# Patient Record
Sex: MALE | Race: OTHER | HISPANIC OR LATINO | Employment: FULL TIME | URBAN - METROPOLITAN AREA
[De-identification: names, ages, dates, MRNs, and addresses within clinical notes are randomized per-mention and may not be internally consistent; named-entity substitution may affect disease eponyms.]

---

## 2018-06-19 ENCOUNTER — HOSPITAL ENCOUNTER (EMERGENCY)
Facility: HOSPITAL | Age: 23
Discharge: HOME | End: 2018-06-19
Attending: EMERGENCY MEDICINE | Admitting: EMERGENCY MEDICINE
Payer: COMMERCIAL

## 2018-06-19 VITALS
DIASTOLIC BLOOD PRESSURE: 56 MMHG | RESPIRATION RATE: 16 BRPM | HEART RATE: 86 BPM | SYSTOLIC BLOOD PRESSURE: 114 MMHG | BODY MASS INDEX: 23.57 KG/M2 | OXYGEN SATURATION: 97 % | TEMPERATURE: 100.2 F | HEIGHT: 63 IN | WEIGHT: 133 LBS

## 2018-06-19 DIAGNOSIS — J02.0 STREP THROAT: Primary | ICD-10-CM

## 2018-06-19 DIAGNOSIS — E86.0 DEHYDRATION: ICD-10-CM

## 2018-06-19 LAB
ANION GAP SERPL CALCULATED.3IONS-SCNC: 9 MMOL/L (ref 4–13)
BASOPHILS # BLD AUTO: 0.01 THOUSANDS/ΜL (ref 0–0.1)
BASOPHILS NFR BLD AUTO: 0 % (ref 0–1)
BUN SERPL-MCNC: 14 MG/DL (ref 5–25)
CALCIUM SERPL-MCNC: 9.1 MG/DL (ref 8.3–10.1)
CHLORIDE SERPL-SCNC: 94 MMOL/L (ref 100–108)
CO2 SERPL-SCNC: 28 MMOL/L (ref 21–32)
CREAT SERPL-MCNC: 1.12 MG/DL (ref 0.6–1.3)
EOSINOPHIL # BLD AUTO: 0.01 THOUSAND/ΜL (ref 0–0.61)
EOSINOPHIL NFR BLD AUTO: 0 % (ref 0–6)
ERYTHROCYTE [DISTWIDTH] IN BLOOD BY AUTOMATED COUNT: 12.5 % (ref 11.6–15.1)
GFR SERPL CREATININE-BSD FRML MDRD: 93 ML/MIN/1.73SQ M
GLUCOSE SERPL-MCNC: 96 MG/DL (ref 65–140)
HCT VFR BLD AUTO: 42.7 % (ref 36.5–49.3)
HGB BLD-MCNC: 15.5 G/DL (ref 12–17)
LYMPHOCYTES # BLD AUTO: 0.85 THOUSANDS/ΜL (ref 0.6–4.47)
LYMPHOCYTES NFR BLD AUTO: 8 % (ref 14–44)
MCH RBC QN AUTO: 32.3 PG (ref 26.8–34.3)
MCHC RBC AUTO-ENTMCNC: 36.3 G/DL (ref 31.4–37.4)
MCV RBC AUTO: 89 FL (ref 82–98)
MONOCYTES # BLD AUTO: 1.56 THOUSAND/ΜL (ref 0.17–1.22)
MONOCYTES NFR BLD AUTO: 14 % (ref 4–12)
NEUTROPHILS # BLD AUTO: 8.93 THOUSANDS/ΜL (ref 1.85–7.62)
NEUTS SEG NFR BLD AUTO: 79 % (ref 43–75)
NRBC BLD AUTO-RTO: 0 /100 WBCS
PLATELET # BLD AUTO: 104 THOUSANDS/UL (ref 149–390)
PMV BLD AUTO: 9.7 FL (ref 8.9–12.7)
POTASSIUM SERPL-SCNC: 4.1 MMOL/L (ref 3.5–5.3)
RBC # BLD AUTO: 4.8 MILLION/UL (ref 3.88–5.62)
S PYO AG THROAT QL: POSITIVE
SODIUM SERPL-SCNC: 131 MMOL/L (ref 136–145)
WBC # BLD AUTO: 11.36 THOUSAND/UL (ref 4.31–10.16)

## 2018-06-19 PROCEDURE — 80048 BASIC METABOLIC PNL TOTAL CA: CPT | Performed by: EMERGENCY MEDICINE

## 2018-06-19 PROCEDURE — 36415 COLL VENOUS BLD VENIPUNCTURE: CPT | Performed by: EMERGENCY MEDICINE

## 2018-06-19 PROCEDURE — 85025 COMPLETE CBC W/AUTO DIFF WBC: CPT | Performed by: EMERGENCY MEDICINE

## 2018-06-19 PROCEDURE — 96374 THER/PROPH/DIAG INJ IV PUSH: CPT

## 2018-06-19 PROCEDURE — 87430 STREP A AG IA: CPT | Performed by: EMERGENCY MEDICINE

## 2018-06-19 PROCEDURE — 86308 HETEROPHILE ANTIBODY SCREEN: CPT | Performed by: EMERGENCY MEDICINE

## 2018-06-19 PROCEDURE — 96361 HYDRATE IV INFUSION ADD-ON: CPT

## 2018-06-19 PROCEDURE — 96375 TX/PRO/DX INJ NEW DRUG ADDON: CPT

## 2018-06-19 PROCEDURE — 99283 EMERGENCY DEPT VISIT LOW MDM: CPT

## 2018-06-19 RX ORDER — ONDANSETRON 2 MG/ML
4 INJECTION INTRAMUSCULAR; INTRAVENOUS ONCE
Status: COMPLETED | OUTPATIENT
Start: 2018-06-19 | End: 2018-06-19

## 2018-06-19 RX ORDER — AMOXICILLIN 500 MG/1
1000 CAPSULE ORAL EVERY 24 HOURS
Qty: 2 CAPSULE | Refills: 0 | Status: SHIPPED | OUTPATIENT
Start: 2018-06-19 | End: 2018-06-29

## 2018-06-19 RX ORDER — ACETAMINOPHEN 325 MG/1
650 TABLET ORAL ONCE
Status: COMPLETED | OUTPATIENT
Start: 2018-06-19 | End: 2018-06-19

## 2018-06-19 RX ORDER — AMOXICILLIN 250 MG/1
1000 CAPSULE ORAL ONCE
Status: COMPLETED | OUTPATIENT
Start: 2018-06-19 | End: 2018-06-19

## 2018-06-19 RX ORDER — KETOROLAC TROMETHAMINE 30 MG/ML
15 INJECTION, SOLUTION INTRAMUSCULAR; INTRAVENOUS ONCE
Status: COMPLETED | OUTPATIENT
Start: 2018-06-19 | End: 2018-06-19

## 2018-06-19 RX ORDER — NAPROXEN 250 MG/1
250 TABLET ORAL
Qty: 21 TABLET | Refills: 0 | Status: SHIPPED | OUTPATIENT
Start: 2018-06-19 | End: 2019-09-02

## 2018-06-19 RX ORDER — ONDANSETRON 4 MG/1
4 TABLET, FILM COATED ORAL EVERY 6 HOURS
Qty: 7 TABLET | Refills: 0 | Status: SHIPPED | OUTPATIENT
Start: 2018-06-19 | End: 2019-09-02

## 2018-06-19 RX ADMIN — KETOROLAC TROMETHAMINE 15 MG: 30 INJECTION, SOLUTION INTRAMUSCULAR at 16:03

## 2018-06-19 RX ADMIN — SODIUM CHLORIDE 1000 ML: 0.9 INJECTION, SOLUTION INTRAVENOUS at 16:02

## 2018-06-19 RX ADMIN — ACETAMINOPHEN 650 MG: 325 TABLET, FILM COATED ORAL at 16:03

## 2018-06-19 RX ADMIN — ONDANSETRON 4 MG: 2 INJECTION INTRAMUSCULAR; INTRAVENOUS at 16:01

## 2018-06-19 RX ADMIN — AMOXICILLIN 1000 MG: 250 CAPSULE ORAL at 17:14

## 2018-06-19 NOTE — DISCHARGE INSTRUCTIONS
Strep Throat, Ambulatory Care   GENERAL INFORMATION:   Strep throat  is a throat infection caused by bacteria  It is easily spread from person to person  Common symptoms include the following:   · Sore, red, and swollen throat    · Fever and headache     · Upset stomach, abdominal pain, or vomiting    · White or yellow patches or blisters in the back of your throat    · Tender, swollen lumps on the sides of your neck or jaw    · Throat pain when you swallow  Seek immediate care for the following symptoms:   · Throat pain that makes it too painful to drink fluids    · Drooling because you cannot swallow your spit    · Not able to open your mouth all the way or your voice is muffled    · Trouble breathing because your throat is swollen    · New symptoms like a bad headache, stiff neck, chest pain, or vomiting    · Blood in your urine and swollen face, feet, or hands  Treatment for strep throat:  You will need antibiotic medicine to treat your strep throat  Take your antibiotics until they are gone, even if you feel better  Do this unless your caregiver says it is okay to stop your antibiotics  You may return to work or school 24 hours after you start antibiotics  Manage strep throat:   · Do not smoke  If you smoke, it is never too late to quit  Smoking may make your symptoms worse  Ask for information if you need help quitting  · Drink juice, milk shakes, or soup  if your throat is too sore to eat solid food  Drinking liquids can also help prevent dehydration  · Gargle with salt water  Mix ¼ teaspoon salt in a glass of warm water and gargle  This may help reduce swelling in your throat  · Use lozenges, ice, soft foods, or popsicles  to soothe your throat    Prevent the spread of strep throat:   · Do not share food or drinks    · Wash your hands often    · Replace your toothbrush after you have taken antibiotics for 24 hours  Follow up with your healthcare provider as directed:  Write down your questions so you remember to ask them during your visits  CARE AGREEMENT:   You have the right to help plan your care  Learn about your health condition and how it may be treated  Discuss treatment options with your caregivers to decide what care you want to receive  You always have the right to refuse treatment  The above information is an  only  It is not intended as medical advice for individual conditions or treatments  Talk to your doctor, nurse or pharmacist before following any medical regimen to see if it is safe and effective for you  © 2014 [address] is for End User's use only and may not be sold, redistributed or otherwise used for commercial purposes  All illustrations and images included in CareNotes® are the copyrighted property of A D [employer] M [employer] Inc  or Felipe [employer]  Dehydration   WHAT YOU NEED TO KNOW:   Dehydration is a condition that develops when your body does not have enough fluid  You may become dehydrated if you do not drink enough water or lose too much fluid  Fluid loss may also cause loss of electrolytes (minerals), such as sodium  DISCHARGE INSTRUCTIONS:   Seek care immediately if:   · You have a seizure  · You are confused or cannot think clearly  · You are extremely sleepy, or another person cannot wake you  · You become dizzy or faint when you stand  · You are not able to urinate  · You have trouble breathing  · You have a fast or irregular heartbeat  · Your hands or feet are cold, or your face is pale  Contact your healthcare provider if:   · You have trouble drinking liquids because you are vomiting  · Your symptoms get worse  · You have a fever  · You feel very weak or tired  · You have questions or concerns about your condition or care  Follow up with your healthcare provider as directed:  Write down your questions so you remember to ask them during your visits     Prevent or manage dehydration: · Drink liquids as directed  Liquids that contain water, sugar, and minerals can help your body hold in fluid and help prevent dehydration  Drink liquids throughout the day, not just when you feel thirsty  Men should drink about 3 liters (13 eight-ounce cups) of liquid each day  Women should drink about 2 liters (9 eight-ounce cups) of liquid each day  Drink even more liquid if you will be outdoors, in the sun for a long time, or exercising  · Stay cool  Limit the time you spend outdoors during the hottest part of the day  Dress in lightweight clothes  · Keep track of how often you urinate  If you urinate less than usual or your urine is darker, drink more liquids  © 2017 [facility] Information is for End User's use only and may not be sold, redistributed or otherwise used for commercial purposes  All illustrations and images included in CareNotes® are the copyrighted property of A D [employer] M [employer] Inc  or [address]  The above information is an  only  It is not intended as medical advice for individual conditions or treatments  Talk to your doctor, nurse or pharmacist before following any medical regimen to see if it is safe and effective for you

## 2018-06-19 NOTE — ED PROVIDER NOTES
History  Chief Complaint   Patient presents with    Fever - 9 weeks to 74 years     pt reports fever, vomiting, body aches, neck pain x 3 days  Pt last took Ibuprofen at 1100 today  Vomiting bile   +generalized abd pain     [place] male presents for evaluation of fever since Sunday  Patient states the fever gets better when taking ibuprofen with improvement in symptoms  Patient rates the fevers are as high as 104-106  Patient states this is associated with a sore throat without difficulty swallowing or change in voice, mild diffuse headache, generalized body aches and malaise, mild neck pain, dry cough, soreness in his left upper quadrant which has been present for the past 2 days, constant, nonradiating, without modifying factors  Patient denies rash, recent travel or known sick contacts  History provided by:  Patient  Fever - 9 weeks to 74 years   Associated symptoms: cough, headaches, nausea, sore throat and vomiting    Associated symptoms: no chest pain, no congestion, no diarrhea, no dysuria, no ear pain, no myalgias, no rash and no rhinorrhea        None       History reviewed  No pertinent past medical history  History reviewed  No pertinent surgical history  History reviewed  No pertinent family history  I have reviewed and agree with the history as documented  Social History   Substance Use Topics    Smoking status: Heavy Tobacco Smoker     Packs/day: 0 50     Types: Cigarettes    Smokeless tobacco: Never Used    Alcohol use Yes      Comment: rarely         Review of Systems   Constitutional: Negative for activity change, appetite change, fatigue and fever  HENT: Positive for sore throat  Negative for congestion, dental problem, ear pain, rhinorrhea, trouble swallowing and voice change  Eyes: Negative for pain and redness  Respiratory: Positive for cough  Negative for chest tightness, shortness of breath and wheezing      Cardiovascular: Negative for chest pain and palpitations  Gastrointestinal: Positive for abdominal pain, nausea and vomiting  Negative for blood in stool, constipation and diarrhea  Endocrine: Negative for cold intolerance and heat intolerance  Genitourinary: Negative for dysuria, frequency, hematuria, scrotal swelling and testicular pain  Musculoskeletal: Positive for neck pain  Negative for arthralgias, myalgias and neck stiffness  Skin: Negative for color change, pallor and rash  Neurological: Positive for headaches  Negative for weakness and numbness  Hematological: Does not bruise/bleed easily  Psychiatric/Behavioral: Negative for agitation, hallucinations and suicidal ideas  Physical Exam  Physical Exam   Constitutional: He is oriented to person, place, and time  He appears well-developed and well-nourished  HENT:   Mouth/Throat: Oropharyngeal exudate present  TMs normal bilaterally +pharyngeal erythema no rhinorrhea nontender palpation of sinuses, normal looking turbinates  Midline uvula, nml voice, tolerating secretions   Eyes: Conjunctivae and EOM are normal    Neck: Normal range of motion  Neck supple  No meningeal signs   Cardiovascular: Normal rate, regular rhythm, normal heart sounds and intact distal pulses  Pulmonary/Chest: Effort normal and breath sounds normal  No respiratory distress  He has no wheezes  He has no rales  He exhibits no tenderness  Abdominal: Soft  Bowel sounds are normal  He exhibits no distension and no mass  There is no tenderness  No hernia  No cvat   Musculoskeletal: Normal range of motion  He exhibits no edema  Lymphadenopathy:     He has cervical adenopathy  Neurological: He is alert and oriented to person, place, and time  No cranial nerve deficit  Skin: No rash noted  No erythema  No edema   Psychiatric: He has a normal mood and affect  His behavior is normal    Nursing note and vitals reviewed        Vital Signs  ED Triage Vitals [06/19/18 1507]   Temperature Pulse Respirations Blood Pressure SpO2   (!) 101 4 °F (38 6 °C) (!) 116 18 146/77 98 %      Temp Source Heart Rate Source Patient Position - Orthostatic VS BP Location FiO2 (%)   Oral Monitor Sitting Right arm --      Pain Score       8           Vitals:    06/19/18 1507 06/19/18 1647   BP: 146/77 114/56   Pulse: (!) 116 86   Patient Position - Orthostatic VS: Sitting Lying       Visual Acuity      ED Medications  Medications   amoxicillin (AMOXIL) capsule 1,000 mg (not administered)   acetaminophen (TYLENOL) tablet 650 mg (650 mg Oral Given 6/19/18 1603)   sodium chloride 0 9 % bolus 1,000 mL (1,000 mL Intravenous New Bag 6/19/18 1602)   ketorolac (TORADOL) injection 15 mg (15 mg Intravenous Given 6/19/18 1603)   ondansetron (ZOFRAN) injection 4 mg (4 mg Intravenous Given 6/19/18 1601)       Diagnostic Studies  Results Reviewed     Procedure Component Value Units Date/Time    Basic metabolic panel [[id number]]  (Abnormal) Collected:  06/19/18 1600    Lab Status:  Final result Specimen:  Blood from Arm, Right Updated:  06/19/18 1633     Sodium 131 (L) mmol/L      Potassium 4 1 mmol/L      Chloride 94 (L) mmol/L      CO2 28 mmol/L      Anion Gap 9 mmol/L      BUN 14 mg/dL      Creatinine 1 12 mg/dL      Glucose 96 mg/dL      Calcium 9 1 mg/dL      eGFR 93 ml/min/1 73sq m     Narrative:         National Kidney Disease Education Program recommendations are as follows:  GFR calculation is accurate only with a steady state creatinine  Chronic Kidney disease less than 60 ml/min/1 73 sq  meters  Kidney failure less than 15 ml/min/1 73 sq  meters      Rapid Strep A Screen Only, Adults [[id number]]  (Abnormal) Collected:  06/19/18 1600    Lab Status:  Final result Specimen:  Throat from Throat Updated:  06/19/18 1617     Rapid Strep A Screen Positive (A)    CBC and differential [[id number]]  (Abnormal) Collected:  06/19/18 1600    Lab Status:  Final result Specimen:  Blood from Arm, Right Updated:  06/19/18 1611     WBC 11 36 (H) Thousand/uL      RBC 4 80 Million/uL      Hemoglobin 15 5 g/dL      Hematocrit 42 7 %      MCV 89 fL      MCH 32 3 pg      MCHC 36 3 g/dL      RDW 12 5 %      MPV 9 7 fL      Platelets 662 (L) Thousands/uL      nRBC 0 /100 WBCs      Neutrophils Relative 79 (H) %      Lymphocytes Relative 8 (L) %      Monocytes Relative 14 (H) %      Eosinophils Relative 0 %      Basophils Relative 0 %      Neutrophils Absolute 8 93 (H) Thousands/µL      Lymphocytes Absolute 0 85 Thousands/µL      Monocytes Absolute 1 56 (H) Thousand/µL      Eosinophils Absolute 0 01 Thousand/µL      Basophils Absolute 0 01 Thousands/µL     Mononucleosis screen [[id number]] Collected:  06/19/18 1600    Lab Status: In process Specimen:  Blood from Arm, Right Updated:  06/19/18 1606    POCT urinalysis dipstick [[id number]]     Lab Status:  No result Specimen:  Urine                  No orders to display              Procedures  Procedures       Phone Contacts  ED Phone Contact    ED Course  ED Course as of Jun 19 1710   Tue Jun 19, 2018   1622 RAPID STREP A SCREEN: (!) Positive                               MDM  CritCare Time    Disposition  Final diagnoses:   Strep throat   Dehydration     Time reflects when diagnosis was documented in both MDM as applicable and the Disposition within this note     Time User Action Codes Description Comment    6/19/2018  5:09 PM Agnes Contreras Add [J02 0] Strep throat     6/19/2018  5:09 PM Glo Godwin Add [E86 0] Dehydration       ED Disposition     ED Disposition Condition Comment    Discharge  Amisha Hand discharge to home/self care      Condition at discharge: Good        Follow-up Information     Follow up With Specialties Details Why Contact Info    Radha Nation MD  Schedule an appointment as soon as possible for a visit in 2 days  [address] [place] [id number] [phone]            Patient's Medications   Discharge Prescriptions    AMOXICILLIN (AMOXIL) 500 MG CAPSULE    Take 2 capsules (1,000 mg total) by mouth every 24 hours for 10 days       Start Date: 6/19/2018 End Date: 6/29/2018       Order Dose: 1,000 mg       Quantity: 2 capsule    Refills: 0    NAPROXEN (NAPROSYN) 250 MG TABLET    Take 1 tablet (250 mg total) by mouth 3 (three) times a day with meals for 7 days       Start Date: 6/19/2018 End Date: 6/26/2018       Order Dose: 250 mg       Quantity: 21 tablet    Refills: 0    ONDANSETRON (ZOFRAN) 4 MG TABLET    Take 1 tablet (4 mg total) by mouth every 6 (six) hours for 7 doses       Start Date: 6/19/2018 End Date: 6/21/2018       Order Dose: 4 mg       Quantity: 7 tablet    Refills: 0     No discharge procedures on file      ED Provider  Electronically Signed by           Jyoti Rowland MD  06/19/18 7316

## 2018-06-20 LAB — HETEROPH AB SER QL: NEGATIVE

## 2019-09-02 ENCOUNTER — HOSPITAL ENCOUNTER (EMERGENCY)
Facility: HOSPITAL | Age: 24
Discharge: HOME | End: 2019-09-02
Attending: EMERGENCY MEDICINE | Admitting: EMERGENCY MEDICINE
Payer: COMMERCIAL

## 2019-09-02 VITALS
BODY MASS INDEX: 23.86 KG/M2 | HEART RATE: 71 BPM | WEIGHT: 134.7 LBS | TEMPERATURE: 96.7 F | SYSTOLIC BLOOD PRESSURE: 155 MMHG | DIASTOLIC BLOOD PRESSURE: 84 MMHG | OXYGEN SATURATION: 99 % | RESPIRATION RATE: 18 BRPM

## 2019-09-02 DIAGNOSIS — K08.89 PAIN, DENTAL: Primary | ICD-10-CM

## 2019-09-02 PROCEDURE — 99282 EMERGENCY DEPT VISIT SF MDM: CPT

## 2019-09-02 PROCEDURE — 99284 EMERGENCY DEPT VISIT MOD MDM: CPT | Performed by: EMERGENCY MEDICINE

## 2019-09-02 RX ORDER — IBUPROFEN 600 MG/1
600 TABLET ORAL ONCE
Status: COMPLETED | OUTPATIENT
Start: 2019-09-02 | End: 2019-09-02

## 2019-09-02 RX ORDER — EMTRICITABINE AND TENOFOVIR DISOPROXIL FUMARATE 200; 300 MG/1; MG/1
1 TABLET, FILM COATED ORAL DAILY
COMMUNITY
Start: 2017-11-29

## 2019-09-02 RX ORDER — IBUPROFEN 600 MG/1
600 TABLET ORAL EVERY 6 HOURS PRN
Qty: 20 TABLET | Refills: 0 | Status: SHIPPED | OUTPATIENT
Start: 2019-09-02

## 2019-09-02 RX ORDER — PENICILLIN V POTASSIUM 500 MG/1
500 TABLET ORAL 4 TIMES DAILY
Qty: 28 TABLET | Refills: 0 | Status: SHIPPED | OUTPATIENT
Start: 2019-09-02 | End: 2019-09-09

## 2019-09-02 RX ORDER — PENICILLIN V POTASSIUM 250 MG/1
500 TABLET ORAL ONCE
Status: COMPLETED | OUTPATIENT
Start: 2019-09-02 | End: 2019-09-02

## 2019-09-02 RX ADMIN — PENICILLIN V POTASSIUM 500 MG: 250 TABLET, FILM COATED ORAL at 08:31

## 2019-09-02 RX ADMIN — IBUPROFEN 600 MG: 600 TABLET ORAL at 08:31

## 2019-09-02 NOTE — ED PROVIDER NOTES
History  Chief Complaint   Patient presents with    Dental Pain     Tooth pain for more than 1 month right side and top left  does not have a dentist  No pain meds  21-year-old male presenting with right lower and left upper dental pain this been ongoing for the last month  Patient has not been evaluated by dentist in "a few years"  No pain medication prior to arrival   Denies fevers  Able to tolerate p o  Without difficulty  Denies sore throat  Prior to Admission Medications   Prescriptions Last Dose Informant Patient Reported? Taking?   emtricitabine-tenofovir (TRUVADA) 200-300 mg per tablet More than a month at Unknown time  Yes No   Sig: Take 1 tablet by mouth daily      Facility-Administered Medications: None       History reviewed  No pertinent past medical history  History reviewed  No pertinent surgical history  History reviewed  No pertinent family history  I have reviewed and agree with the history as documented  Social History     Tobacco Use    Smoking status: Heavy Tobacco Smoker     Packs/day: 0 50     Types: Cigarettes    Smokeless tobacco: Never Used   Substance Use Topics    Alcohol use: Yes     Comment: rarely     Drug use: Yes     Types: Marijuana     Comment: last smoked 6/17/18        Review of Systems   Constitutional: Negative for chills, diaphoresis and fever  HENT: Positive for dental problem  Negative for congestion, rhinorrhea, sinus pressure, sore throat and trouble swallowing  Eyes: Negative for pain and visual disturbance  Respiratory: Negative for cough, shortness of breath and wheezing  Cardiovascular: Negative for chest pain and leg swelling  Gastrointestinal: Negative for abdominal pain, diarrhea, nausea and vomiting  Genitourinary: Negative for difficulty urinating, dysuria, frequency and urgency  Musculoskeletal: Negative for back pain and neck pain  Skin: Negative for color change and rash     Neurological: Negative for syncope, numbness and headaches  All other systems reviewed and are negative  Physical Exam  Physical Exam   Constitutional: He is oriented to person, place, and time  He appears well-developed and well-nourished  HENT:   Head: Normocephalic and atraumatic  Mouth/Throat: Oropharynx is clear and moist and mucous membranes are normal  No oral lesions  No trismus in the jaw  Dental caries present  No dental abscesses, uvula swelling or lacerations  No oropharyngeal exudate  Multiple dental caries  Tender to palpation of right lower molars and left upper molars  No evidence of abscess, edema, swelling  No gingival irritation  No submandibular or sublingual tenderness or edema  Posterior oropharynx clear, symmetrical    Eyes: Conjunctivae and EOM are normal    Neck: Normal range of motion  Neck supple  Musculoskeletal: Normal range of motion  He exhibits no edema  Neurological: He is alert and oriented to person, place, and time  Coordination normal    Skin: Skin is warm  Capillary refill takes less than 2 seconds  Nursing note and vitals reviewed        Vital Signs  ED Triage Vitals [09/02/19 0823]   Temperature Pulse Respirations Blood Pressure SpO2   (!) 96 7 °F (35 9 °C) 71 18 155/84 99 %      Temp Source Heart Rate Source Patient Position - Orthostatic VS BP Location FiO2 (%)   Tympanic Monitor Sitting Left arm --      Pain Score       Worst Possible Pain           Vitals:    09/02/19 0823   BP: 155/84   Pulse: 71   Patient Position - Orthostatic VS: Sitting         Visual Acuity      ED Medications  Medications   ibuprofen (MOTRIN) tablet 600 mg (600 mg Oral Given 9/2/19 0831)   penicillin V potassium (VEETID) tablet 500 mg (500 mg Oral Given 9/2/19 0831)       Diagnostic Studies  Results Reviewed     None                 No orders to display              Procedures  Procedures       ED Course                               MDM  Number of Diagnoses or Management Options  Pain, dental:   Diagnosis management comments: 26-year-old male presenting with dentalgia  Administer antibiotics and pain control  Follow up with dentist       Disposition  Final diagnoses:   Pain, dental     Time reflects when diagnosis was documented in both MDM as applicable and the Disposition within this note     Time User Action Codes Description Comment    9/2/2019  8:26 AM Laura Mercado Elian [K08 89] Pain, dental       ED Disposition     ED Disposition Condition Date/Time Comment    Discharge Stable Mon Sep 2, 2019  8:26 AM Irina Mojica discharge to home/self care  Follow-up Information     Follow up With Specialties Details Why [address]  In 2 days For further evaluation [facility] [address] [facility] Emergency Medicine  If symptoms worsen [address] [address] [phone]          Discharge Medication List as of 9/2/2019  8:27 AM      START taking these medications    Details   ibuprofen (MOTRIN) 600 mg tablet Take 1 tablet (600 mg total) by mouth every 6 (six) hours as needed for mild pain, Starting Mon 9/2/2019, Print      penicillin V potassium (VEETID) 500 mg tablet Take 1 tablet (500 mg total) by mouth 4 (four) times a day for 7 days, Starting Mon 9/2/2019, Until Mon 9/9/2019, Print         CONTINUE these medications which have NOT CHANGED    Details   naproxen (NAPROSYN) 250 mg tablet Take 1 tablet (250 mg total) by mouth 3 (three) times a day with meals for 7 days, Starting Tue 6/19/2018, Until Tue 6/26/2018, Print      ondansetron (ZOFRAN) 4 mg tablet Take 1 tablet (4 mg total) by mouth every 6 (six) hours for 7 doses, Starting Tue 6/19/2018, Until Thu 6/21/2018, Print           No discharge procedures on file      ED Provider  Electronically Signed by           Akila Barnard DO  09/02/19 6051

## 2020-02-20 ENCOUNTER — HOSPITAL ENCOUNTER (EMERGENCY)
Facility: HOSPITAL | Age: 25
Discharge: HOME | End: 2020-02-20
Attending: EMERGENCY MEDICINE

## 2020-02-20 VITALS
BODY MASS INDEX: 25.7 KG/M2 | OXYGEN SATURATION: 99 % | SYSTOLIC BLOOD PRESSURE: 138 MMHG | TEMPERATURE: 97.9 F | DIASTOLIC BLOOD PRESSURE: 79 MMHG | HEART RATE: 81 BPM | RESPIRATION RATE: 18 BRPM | WEIGHT: 145.06 LBS

## 2020-02-20 DIAGNOSIS — K08.89 DENTALGIA: ICD-10-CM

## 2020-02-20 DIAGNOSIS — K02.9 DENTAL CARIES: ICD-10-CM

## 2020-02-20 DIAGNOSIS — K04.7 DENTAL INFECTION: Primary | ICD-10-CM

## 2020-02-20 PROCEDURE — 99283 EMERGENCY DEPT VISIT LOW MDM: CPT

## 2020-02-20 PROCEDURE — 99284 EMERGENCY DEPT VISIT MOD MDM: CPT | Performed by: NURSE PRACTITIONER

## 2020-02-20 RX ORDER — AMOXICILLIN 250 MG/1
500 CAPSULE ORAL ONCE
Status: COMPLETED | OUTPATIENT
Start: 2020-02-20 | End: 2020-02-20

## 2020-02-20 RX ORDER — AMOXICILLIN 500 MG/1
500 CAPSULE ORAL 3 TIMES DAILY
Qty: 21 CAPSULE | Refills: 0 | Status: SHIPPED | OUTPATIENT
Start: 2020-02-20 | End: 2020-02-27

## 2020-02-20 RX ORDER — ACETAMINOPHEN 325 MG/1
650 TABLET ORAL ONCE
Status: COMPLETED | OUTPATIENT
Start: 2020-02-20 | End: 2020-02-20

## 2020-02-20 RX ADMIN — ACETAMINOPHEN 650 MG: 325 TABLET ORAL at 01:58

## 2020-02-20 RX ADMIN — AMOXICILLIN 500 MG: 250 CAPSULE ORAL at 01:58

## 2020-02-20 NOTE — DISCHARGE INSTRUCTIONS
You need to follow-up with dental clinic  You have been provided written instruction sheet on dental clinic choices and how to contact them  You appear to have dental infection  You have been prescribed Amoxicillin antibiotic as prescribed for dental infection  You may take Acetaminophen or Ibuprofen per OTC instructions as needed for pain  Practice good dental hygiene with teeth brushing and flossing  Follow-up with health department clinic for STI and HIV screening   Recommended using condoms to decrease kristen factors for STI and HIV

## 2020-02-20 NOTE — ED PROVIDER NOTES
History  Chief Complaint   Patient presents with    Dental Pain     pt states b/l toothache  upper/lower on left side  lower on right  27year old male presents to ED with cc of dental pain  He reports this has been going on since September of last year and has been gradually getting worse  He reports the dental pain location is the right lower side broken tooth, left upper side broken tooth, and left lower crowding of teeth due to need for wisdom teeth extraction  He reports the pain is worse with chewing certain foods and drinking cold fluid  He reports the pain sometimes feels  "soothed" when he uses listerine mouthwash  He tried motrin in the past for pain but it did not help  He has not taken anything for pain relief today  He states he does not check his temperature at home so does not know if he has had any fevers but he reports intermittent chills  He reports he was seen in this ED in September 2019 for dental pain, he was given motrin and antibiotic, and referred to dental clinic  He reports he did go to a dental clinic last year for evaluation but did not follow up for subsequent visits because "it was taking too long for an appointment " He also states he thinks he should be checked for HIV because it has been a few years since he was checked  He is concerned about HIV because he states he has sex with men, multiple parnters without using condoms  He denies trouble swallowing, CP, SOB, abdominal pain, penile discharge, change in bowel/bladder habits  History provided by:  Patient and medical records  Dental Pain   Associated symptoms: oral lesions    Associated symptoms: no congestion, no drooling, no facial swelling and no fever        Prior to Admission Medications   Prescriptions Last Dose Informant Patient Reported?  Taking?   emtricitabine-tenofovir (TRUVADA) 200-300 mg per tablet Not Taking at Unknown time  Yes No   Sig: Take 1 tablet by mouth daily   ibuprofen (MOTRIN) 600 mg tablet Not Taking at Unknown time  No No   Sig: Take 1 tablet (600 mg total) by mouth every 6 (six) hours as needed for mild pain   Patient not taking: Reported on 2/20/2020      Facility-Administered Medications: None       History reviewed  No pertinent past medical history  Past Surgical History:   Procedure Laterality Date    EYE SURGERY         History reviewed  No pertinent family history  I have reviewed and agree with the history as documented  Social History     Tobacco Use    Smoking status: Heavy Tobacco Smoker     Packs/day: 0 50     Types: Cigarettes    Smokeless tobacco: Never Used   Substance Use Topics    Alcohol use: Yes     Comment: rarely     Drug use: Yes     Types: Marijuana     Comment: last smoked 6/17/18       Review of Systems   Constitutional: Positive for chills  Negative for activity change, appetite change, diaphoresis, fatigue, fever and unexpected weight change  HENT: Positive for dental problem and mouth sores  Negative for congestion, drooling, ear discharge, ear pain, facial swelling, hearing loss, nosebleeds, postnasal drip, rhinorrhea, sinus pressure, sinus pain, sneezing, sore throat, tinnitus, trouble swallowing and voice change  Eyes: Negative  Respiratory: Negative  Cardiovascular: Negative  Gastrointestinal: Negative  Endocrine: Negative  Genitourinary: Negative  Musculoskeletal: Negative  Skin: Negative  Allergic/Immunologic: Negative  Neurological: Negative  Hematological: Positive for adenopathy  Does not bruise/bleed easily  Reports he feels swollen tender on his jaw line  Psychiatric/Behavioral: Negative  Physical Exam  Physical Exam   Constitutional: He is oriented to person, place, and time  He appears well-developed and well-nourished  No distress  HENT:   Head: Normocephalic and atraumatic     Right Ear: Tympanic membrane and external ear normal    Left Ear: Tympanic membrane and external ear normal    Nose: Nose normal    Mouth/Throat: Oropharynx is clear and moist  No oropharyngeal exudate  Eyes: Pupils are equal, round, and reactive to light  Conjunctivae and EOM are normal    Neck: Normal range of motion  Neck supple  C/o preauricular tenderness  C/o submandibular tenderness greater on left than right  Cardiovascular: Normal rate, regular rhythm, normal heart sounds and intact distal pulses  Pulmonary/Chest: Effort normal and breath sounds normal  No respiratory distress  Abdominal: Soft  Bowel sounds are normal    Musculoskeletal: Normal range of motion  Lymphadenopathy:     He has cervical adenopathy  Neurological: He is alert and oriented to person, place, and time  Skin: Skin is warm and dry  Capillary refill takes less than 2 seconds  He is not diaphoretic  Psychiatric: He has a normal mood and affect  His behavior is normal  Judgment and thought content normal    Nursing note and vitals reviewed        Vital Signs  ED Triage Vitals [02/20/20 0128]   Temperature Pulse Respirations Blood Pressure SpO2   97 9 °F (36 6 °C) 81 18 138/79 99 %      Temp Source Heart Rate Source Patient Position - Orthostatic VS BP Location FiO2 (%)   Oral Monitor Lying Right arm --      Pain Score       8           Vitals:    02/20/20 0128   BP: 138/79   Pulse: 81   Patient Position - Orthostatic VS: Lying         Visual Acuity      ED Medications  Medications   acetaminophen (TYLENOL) tablet 650 mg (650 mg Oral Given 2/20/20 0158)   amoxicillin (AMOXIL) capsule 500 mg (500 mg Oral Given 2/20/20 0158)       Diagnostic Studies  Results Reviewed     None                 No orders to display              Procedures  Procedures         ED Course       pt verbalizes understanding of all dc instructions and follow up                         MDM  Number of Diagnoses or Management Options  Diagnosis management comments: Dental pain, dental decay/caries    Acetaminophen for pain   Amoxicillin antibiotic  Refer to dental clinic  Refer to health department for STI/HIV screenings        Disposition  Final diagnoses:   Dental infection   [address] caries     Time reflects when diagnosis was documented in both MDM as applicable and the Disposition within this note     Time User Action Codes Description Comment    2/20/2020  2:11 AM Lashell Bunk Add [K04 7] Dental infection     2/20/2020  2:11 AM Lashell Bunk Add [K02 9] Dental caries     2/20/2020  2:11 AM Lynn Hatchet [K08 89] Dentalgia     2/20/2020  2:11 AM Lashell Bunk Remove [K02 9] Dental caries     2/20/2020  2:11 AM Lashell Bunk Add [K02 9] Dental caries       ED Disposition     ED Disposition Condition Date/Time Comment    Discharge Stable Thu Feb 20, 2020  2:21 AM Curvin Peal discharge to home/self care  Follow-up Information     Follow up With Specialties Details Why Contact Info Additional Information    Katy Restrepo MD  In 2 days  [address] [place] [id number]  [facility] Emergency Department Emergency Medicine  If symptoms worsen [facility] [id number] [phone] [place] ED, [address] [facility] , [place], [place], [address] Internal Medicine  You are to call for Physicial exam and further requested testing [address] [facility] [address] [phone]             Patient's Medications   Discharge Prescriptions    AMOXICILLIN (AMOXIL) 500 MG CAPSULE    Take 1 capsule (500 mg total) by mouth 3 (three) times a day for 7 days       Start Date: 2/20/2020 End Date: 2/27/2020       Order Dose: 500 mg       Quantity: 21 capsule    Refills: 0     No discharge procedures on file      PDMP Review     None          ED Provider  Electronically Signed by           Dez Lawrence  02/20/20 0738

## 2021-12-29 ENCOUNTER — HOSPITAL ENCOUNTER (EMERGENCY)
Facility: HOSPITAL | Age: 26
Discharge: HOME | End: 2021-12-29
Attending: EMERGENCY MEDICINE | Admitting: EMERGENCY MEDICINE
Payer: COMMERCIAL

## 2021-12-29 VITALS
WEIGHT: 141.09 LBS | OXYGEN SATURATION: 99 % | HEART RATE: 68 BPM | SYSTOLIC BLOOD PRESSURE: 140 MMHG | DIASTOLIC BLOOD PRESSURE: 93 MMHG | RESPIRATION RATE: 12 BRPM | HEIGHT: 63 IN | BODY MASS INDEX: 25 KG/M2 | TEMPERATURE: 99.1 F

## 2021-12-29 DIAGNOSIS — R53.83 FATIGUE: Primary | ICD-10-CM

## 2021-12-29 DIAGNOSIS — R11.2 NAUSEA AND VOMITING: ICD-10-CM

## 2021-12-29 PROCEDURE — 87636 SARSCOV2 & INF A&B AMP PRB: CPT | Performed by: EMERGENCY MEDICINE

## 2021-12-29 PROCEDURE — 96372 THER/PROPH/DIAG INJ SC/IM: CPT

## 2021-12-29 PROCEDURE — 99284 EMERGENCY DEPT VISIT MOD MDM: CPT | Performed by: EMERGENCY MEDICINE

## 2021-12-29 PROCEDURE — 99283 EMERGENCY DEPT VISIT LOW MDM: CPT

## 2021-12-29 RX ORDER — ONDANSETRON 4 MG/1
4 TABLET, ORALLY DISINTEGRATING ORAL EVERY 8 HOURS PRN
Qty: 10 TABLET | Refills: 0 | Status: SHIPPED | OUTPATIENT
Start: 2021-12-29

## 2021-12-29 RX ORDER — ONDANSETRON 4 MG/1
4 TABLET, ORALLY DISINTEGRATING ORAL ONCE
Status: COMPLETED | OUTPATIENT
Start: 2021-12-29 | End: 2021-12-29

## 2021-12-29 RX ORDER — MAGNESIUM HYDROXIDE/ALUMINUM HYDROXICE/SIMETHICONE 120; 1200; 1200 MG/30ML; MG/30ML; MG/30ML
30 SUSPENSION ORAL ONCE
Status: COMPLETED | OUTPATIENT
Start: 2021-12-29 | End: 2021-12-29

## 2021-12-29 RX ORDER — KETOROLAC TROMETHAMINE 30 MG/ML
15 INJECTION, SOLUTION INTRAMUSCULAR; INTRAVENOUS ONCE
Status: COMPLETED | OUTPATIENT
Start: 2021-12-29 | End: 2021-12-29

## 2021-12-29 RX ADMIN — ALUMINA, MAGNESIA, AND SIMETHICONE ORAL SUSPENSION REGULAR STRENGTH 30 ML: 1200; 1200; 120 SUSPENSION ORAL at 11:56

## 2021-12-29 RX ADMIN — KETOROLAC TROMETHAMINE 15 MG: 30 INJECTION, SOLUTION INTRAMUSCULAR; INTRAVENOUS at 11:57

## 2021-12-29 RX ADMIN — ONDANSETRON 4 MG: 4 TABLET, ORALLY DISINTEGRATING ORAL at 11:56

## 2021-12-29 NOTE — ED PROVIDER NOTES
History  Chief Complaint   Patient presents with    Vomiting     pt reports multiple episodes of vomiting, body pain, feeling warm  occured last night  74-year-old male otherwise healthy presenting for evaluation of 2 days of multiple symptoms  Patient reports feeling warm/flash, no known fevers at home  Reports headache, body aches, nausea/vomiting and epigastric pain  States sister has similar symptoms and he was with her a few days ago  Received 1st COVID vaccine  No chronic medical problems  Benign/reassuring exam and vitals- flu/COVID swab, symptomatic treatment          Prior to Admission Medications   Prescriptions Last Dose Informant Patient Reported? Taking?   emtricitabine-tenofovir (TRUVADA) 200-300 mg per tablet   Yes No   Sig: Take 1 tablet by mouth daily   ibuprofen (MOTRIN) 600 mg tablet   No No   Sig: Take 1 tablet (600 mg total) by mouth every 6 (six) hours as needed for mild pain   Patient not taking: Reported on 2/20/2020      Facility-Administered Medications: None       History reviewed  No pertinent past medical history  Past Surgical History:   Procedure Laterality Date    EYE SURGERY         History reviewed  No pertinent family history  I have reviewed and agree with the history as documented  E-Cigarette/Vaping     E-Cigarette/Vaping Substances     Social History     Tobacco Use    Smoking status: Heavy Tobacco Smoker     Packs/day: 0 50     Types: Cigarettes    Smokeless tobacco: Never Used   Substance Use Topics    Alcohol use: Yes     Comment: rarely     Drug use: Yes     Types: Marijuana     Comment: last smoked 6/17/18       Review of Systems   Constitutional: Negative for chills, fever and unexpected weight change  HENT: Negative for ear pain, rhinorrhea and sore throat  Eyes: Negative for pain and visual disturbance  Respiratory: Negative for cough and shortness of breath  Cardiovascular: Negative for chest pain and leg swelling  Gastrointestinal: Positive for abdominal pain, nausea and vomiting  Negative for constipation and diarrhea  Endocrine: Negative for polydipsia, polyphagia and polyuria  Genitourinary: Negative for dysuria, frequency, hematuria and urgency  Musculoskeletal: Positive for myalgias  Negative for back pain and neck pain  Skin: Negative for color change and rash  Allergic/Immunologic: Negative for environmental allergies and immunocompromised state  Neurological: Positive for headaches  Negative for dizziness, weakness, light-headedness and numbness  Hematological: Negative for adenopathy  Does not bruise/bleed easily  Psychiatric/Behavioral: Negative for agitation and confusion  All other systems reviewed and are negative  Physical Exam  Physical Exam  Vitals and nursing note reviewed  Constitutional:       General: He is not in acute distress  Appearance: He is well-developed  HENT:      Head: Normocephalic and atraumatic  Nose: Nose normal    Eyes:      Conjunctiva/sclera: Conjunctivae normal    Cardiovascular:      Rate and Rhythm: Normal rate and regular rhythm  Heart sounds: Normal heart sounds  Pulmonary:      Effort: Pulmonary effort is normal  No respiratory distress  Breath sounds: Normal breath sounds  No stridor  No wheezing or rales  Chest:      Chest wall: No tenderness  Abdominal:      General: There is no distension  Palpations: Abdomen is soft  Tenderness: There is abdominal tenderness  There is no guarding or rebound  Comments: Mild epigastric ttp, no rebound/guarding   Musculoskeletal:         General: No swelling, tenderness or deformity  Cervical back: Normal range of motion and neck supple  Skin:     General: Skin is warm and dry  Findings: No rash  Neurological:      General: No focal deficit present  Mental Status: He is alert and oriented to person, place, and time  Motor: No abnormal muscle tone  Coordination: Coordination normal    Psychiatric:         Thought Content: Thought content normal          Judgment: Judgment normal          Vital Signs  ED Triage Vitals [12/29/21 1109]   Temperature Pulse Respirations Blood Pressure SpO2   99 1 °F (37 3 °C) 68 12 140/93 99 %      Temp Source Heart Rate Source Patient Position - Orthostatic VS BP Location FiO2 (%)   Oral -- -- -- --      Pain Score       5           Vitals:    12/29/21 1109   BP: 140/93   Pulse: 68         Visual Acuity      ED Medications  Medications   ketorolac (TORADOL) injection 15 mg (15 mg Intramuscular Given 12/29/21 1157)   ondansetron (ZOFRAN-ODT) dispersible tablet 4 mg (4 mg Oral Given 12/29/21 1156)   aluminum-magnesium hydroxide-simethicone (MYLANTA) oral suspension 30 mL (30 mL Oral Given 12/29/21 1156)       Diagnostic Studies  Results Reviewed     Procedure Component Value Units Date/Time    COVID/FLU - 24 hour TAT [[id number]] Collected: 12/29/21 1159    Lab Status:  In process Specimen: Nares from Nasopharyngeal Swab Updated: 12/29/21 1201                 No orders to display              Procedures  Procedures         ED Course                                             MDM  Number of Diagnoses or Management Options  Fatigue  Nausea and vomiting  Diagnosis management comments: 26 yo M with viral sx/N/V/epigastric discomfort, covid/flu swab done, symptomatic tx, return precautions reviewed       Amount and/or Complexity of Data Reviewed  Clinical lab tests: ordered        Disposition  Final diagnoses:   Fatigue   Nausea and vomiting     Time reflects when diagnosis was documented in both MDM as applicable and the Disposition within this note     Time User Action Codes Description Comment    12/29/2021 12:07 PM Reva Cost Add [R53 83] Fatigue     12/29/2021 12:07 PM Reva Cost Add [R11 2] Nausea and vomiting       ED Disposition     ED Disposition Condition Date/Time Comment    Discharge Stable Wed Dec 29, 2021 12:07 PM Ruthy Carlos Pio Gardner discharge to home/self care  Follow-up Information     Follow up With Specialties Details Why Contact Info    Christel Mcmahon MD    [address] [address] [facility] [id number] [phone]            Discharge Medication List as of 12/29/2021 12:14 PM      START taking these medications    Details   ondansetron (ZOFRAN-ODT) 4 mg disintegrating tablet Take 1 tablet (4 mg total) by mouth every 8 (eight) hours as needed for nausea for up to 10 doses, Starting Wed 12/29/2021, Print         CONTINUE these medications which have NOT CHANGED    Details   emtricitabine-tenofovir (TRUVADA) 200-300 mg per tablet Take 1 tablet by mouth daily, Starting Wed 11/29/2017, Historical Med      ibuprofen (MOTRIN) 600 mg tablet Take 1 tablet (600 mg total) by mouth every 6 (six) hours as needed for mild pain, Starting Mon 9/2/2019, Print             No discharge procedures on file      PDMP Review     None          ED Provider  Electronically Signed by           Veda Rios DO  12/29/21 7910

## 2021-12-29 NOTE — DISCHARGE INSTRUCTIONS
COVID and Flu tests in process  Self isolate/stay home until tests result     Get [facility] for easy access to results, otherwise we will call with results    Tylenol/Ibuprofen as needed for headache, fevers, body aches  Zofran as needed for nausea/vomiting  Stay well hydrated    Return to ER if any new or worsening symptoms including but not limited to difficulty breathing, lightheadedness, passing out, right lower abdominal pain, etc

## 2022-01-05 ENCOUNTER — TELEPHONE (OUTPATIENT)
Dept: FAMILY MEDICINE CLINIC | Facility: CLINIC | Age: 27
End: 2022-01-05

## 2022-01-05 LAB
FLUAV RNA RESP QL NAA+PROBE: NEGATIVE
FLUBV RNA RESP QL NAA+PROBE: NEGATIVE
SARS-COV-2 RNA RESP QL NAA+PROBE: POSITIVE

## 2022-01-05 NOTE — RESULT ENCOUNTER NOTE
[facility]  COVID letter sent via [address]      Criteria met - unknown vaccination status, HIV

## 2022-01-05 NOTE — TELEPHONE ENCOUNTER
----- Message from Neo Cespedes RN sent at 1/5/2022  9:19 AM EST -----  Patient saw result on [facility] [employer]  COVID letter sent via [address]      Criteria met - unknown vaccination status, HIV

## 2022-01-05 NOTE — TELEPHONE ENCOUNTER
Called pt to f/u with + COVID  Day 9 os s/s  Does not qualify for MAB  Phone was not accepting messages

## 2022-02-03 ENCOUNTER — HOSPITAL ENCOUNTER (EMERGENCY)
Facility: HOSPITAL | Age: 27
Discharge: HOME | End: 2022-02-03
Attending: EMERGENCY MEDICINE | Admitting: EMERGENCY MEDICINE
Payer: COMMERCIAL

## 2022-02-03 VITALS
DIASTOLIC BLOOD PRESSURE: 58 MMHG | OXYGEN SATURATION: 97 % | HEART RATE: 66 BPM | TEMPERATURE: 98.6 F | RESPIRATION RATE: 18 BRPM | SYSTOLIC BLOOD PRESSURE: 127 MMHG

## 2022-02-03 DIAGNOSIS — K08.89 DENTALGIA: Primary | ICD-10-CM

## 2022-02-03 DIAGNOSIS — K02.9 DENTAL CARIES: ICD-10-CM

## 2022-02-03 PROCEDURE — 99284 EMERGENCY DEPT VISIT MOD MDM: CPT | Performed by: PHYSICIAN ASSISTANT

## 2022-02-03 PROCEDURE — 99282 EMERGENCY DEPT VISIT SF MDM: CPT

## 2022-02-03 RX ORDER — NAPROXEN 500 MG/1
500 TABLET ORAL 2 TIMES DAILY WITH MEALS
Qty: 30 TABLET | Refills: 0 | Status: SHIPPED | OUTPATIENT
Start: 2022-02-03

## 2022-02-04 NOTE — DISCHARGE INSTRUCTIONS
You were seen in the emergency department today for dental pain  Please follow-up with your primary care physician regarding your symptoms  Please return with any worsening symptoms, facial swelling, difficulty swallowing, swelling in the neck, fevers, chills, or any other concerning symptoms  Dental clinics attached

## 2022-02-04 NOTE — ED PROVIDER NOTES
History  Chief Complaint   Patient presents with    Dental Pain     pt states he has a bad toothache and was sent here to get a referral to an oral surgeon  reports L sided upper and lower dental pain  Wendy Anand is a 27 y o   male with no significant PMH who presents to the emergency department with dental pain  The patient states that over the last few months he has had pain in his left lower second premolar and first left upper molar  He states he has had this pain for months and is looking for an oral surgeon  He states pain is worse with hot and cold liquids as well as sweets  States he has not seen a dentist for this in the past  He denies any trismus, difficulty swallowing, neck pain, neck stiffness, sinus pressure, fevers, chills, or night sweats  He states pain has not changed more recently and he is just looking for a referral              History provided by:  Patient   used: No    Dental Pain  Location:  Lower and upper  Upper teeth location:  14/EBONI 1st molar  Lower teeth location:  20/LL 2nd bicuspid  Severity:  Mild  Duration:  3 months  Timing:  Constant  Progression:  Unchanged  Chronicity:  Chronic  Context: dental caries    Context: not abscess, cap still on, not crown fracture, not dental fracture, not enamel fracture, filling intact, not intrusion, not malocclusion, normal dentition, not recent dental surgery and not trauma    Relieved by:  Acetaminophen  Worsened by:  Cold food/drink and hot food/drink  Associated symptoms: no congestion, no difficulty swallowing, no drooling, no facial pain, no facial swelling, no fever, no gum swelling, no headaches, no neck pain, no neck swelling, no oral bleeding, no oral lesions and no trismus        Prior to Admission Medications   Prescriptions Last Dose Informant Patient Reported?  Taking?   emtricitabine-tenofovir (TRUVADA) 200-300 mg per tablet   Yes No   Sig: Take 1 tablet by mouth daily   ibuprofen (MOTRIN) 600 mg tablet No No   Sig: Take 1 tablet (600 mg total) by mouth every 6 (six) hours as needed for mild pain   Patient not taking: Reported on 2/20/2020   ondansetron (ZOFRAN-ODT) 4 mg disintegrating tablet   No No   Sig: Take 1 tablet (4 mg total) by mouth every 8 (eight) hours as needed for nausea for up to 10 doses      Facility-Administered Medications: None       History reviewed  No pertinent past medical history  Past Surgical History:   Procedure Laterality Date    EYE SURGERY         History reviewed  No pertinent family history  I have reviewed and agree with the history as documented  E-Cigarette/Vaping     E-Cigarette/Vaping Substances     Social History     Tobacco Use    Smoking status: Heavy Tobacco Smoker     Packs/day: 1 00     Types: Cigarettes    Smokeless tobacco: Never Used   Substance Use Topics    Alcohol use: Yes     Comment: rarely     Drug use: Not Currently     Types: Marijuana     Comment: last smoked 6/17/18       Review of Systems   Constitutional: Negative for activity change, appetite change, chills, fever and unexpected weight change  HENT: Positive for dental problem  Negative for congestion, drooling, ear discharge, facial swelling, mouth sores, postnasal drip, rhinorrhea, sinus pressure, sinus pain and sore throat  Respiratory: Negative for apnea, cough, choking, chest tightness, shortness of breath, wheezing and stridor  Cardiovascular: Negative for chest pain, palpitations and leg swelling  Gastrointestinal: Negative for abdominal pain, blood in stool, constipation, diarrhea, nausea and vomiting  Genitourinary: Negative for dysuria, flank pain, frequency and urgency  Musculoskeletal: Negative for arthralgias, myalgias, neck pain and neck stiffness  Skin: Negative for color change, pallor, rash and wound  Neurological: Negative for dizziness, tremors, seizures, syncope, light-headedness, numbness and headaches     All other systems reviewed and are negative  Physical Exam  Physical Exam  Vitals and nursing note reviewed  Constitutional:       General: He is not in acute distress  Appearance: Normal appearance  He is normal weight  He is not ill-appearing or toxic-appearing  Comments: In NAD, tolerating secretions    HENT:      Head: Normocephalic and atraumatic  Right Ear: Tympanic membrane and ear canal normal       Left Ear: Tympanic membrane and ear canal normal       Nose: Nose normal       Mouth/Throat:      Lips: Pink  No lesions  Mouth: Mucous membranes are moist  No injury, lacerations, oral lesions or angioedema  Dentition: Abnormal dentition  Does not have dentures  Dental tenderness and dental caries present  No gingival swelling, dental abscesses or gum lesions  Tongue: No lesions  Tongue does not deviate from midline  Palate: No mass and lesions  Pharynx: Oropharynx is clear  Uvula midline  No pharyngeal swelling, oropharyngeal exudate, posterior oropharyngeal erythema or uvula swelling  Tonsils: No tonsillar exudate or tonsillar abscesses  Comments: Uvula midline, no tonsillar erythema or exudate  Multiple dental caries  No gingival swelling or erythema  No neck swelling or cervical lymphadenopathy  Eyes:      Pupils: Pupils are equal, round, and reactive to light  Cardiovascular:      Rate and Rhythm: Normal rate and regular rhythm  Pulses: Normal pulses  Heart sounds: Normal heart sounds  No murmur heard  No friction rub  No gallop  Pulmonary:      Effort: Pulmonary effort is normal  No respiratory distress  Breath sounds: Normal breath sounds  No stridor  No wheezing, rhonchi or rales  Abdominal:      General: Abdomen is flat  Bowel sounds are normal  There is no distension  Palpations: Abdomen is soft  There is no mass  Tenderness: There is no abdominal tenderness  There is no guarding or rebound  Hernia: No hernia is present     Musculoskeletal: General: No swelling, tenderness or deformity  Normal range of motion  Cervical back: Normal range of motion  No rigidity or tenderness  Skin:     General: Skin is warm and dry  Capillary Refill: Capillary refill takes less than 2 seconds  Neurological:      General: No focal deficit present  Mental Status: He is alert and oriented to person, place, and time  Mental status is at baseline  Cranial Nerves: No cranial nerve deficit  Sensory: No sensory deficit  Motor: No weakness  Vital Signs  ED Triage Vitals   Temperature Pulse Respirations Blood Pressure SpO2   02/03/22 1950 02/03/22 1949 02/03/22 1949 02/03/22 1949 02/03/22 1949   98 6 °F (37 °C) 66 18 127/58 97 %      Temp Source Heart Rate Source Patient Position - Orthostatic VS BP Location FiO2 (%)   02/03/22 1950 02/03/22 1949 02/03/22 1949 02/03/22 1949 --   Oral Monitor Sitting Left arm       Pain Score       02/03/22 1950       10 - Worst Possible Pain           Vitals:    02/03/22 1949   BP: 127/58   Pulse: 66   Patient Position - Orthostatic VS: Sitting         Visual Acuity      ED Medications  Medications - No data to display    Diagnostic Studies  Results Reviewed     None                 No orders to display              Procedures  Procedures         ED Course                                             MDM  Number of Diagnoses or Management Options  Dental caries: established and worsening  Dentalgia: new and does not require workup  Diagnosis management comments: Patient was seen and examined  in the emergency department for chief complaint of dentalgia  The patient presented with dental caries and dental pain that have been present for months  Not worsening  No fevers, chill, difficulty swallowing, trismus, gingival swelling, or presence of abscess  Patient looking for dental referral  On exam patient in NAD, tolerating secretions  Dental caries without evidence of abscess   No neck swelling or lymphadenopathy  DDx including but not limited to: dental graham, dental infection, dental abscess, dry socket, gingivitis; doubt sanjuana's angina  Workup:Exam reassuring, will give follow-up  Naprosyn for pain  Disposition:General impression is 27 year olf male with dentalgia and dental caries  Dental clinical paperwork provided  Naprosyn sent  RTED discussed  The patient was discharged in stable condition  Patient ambulated off the department  Extensive return to emergency department precautions were discussed  Follow up with appropriate providers including primary care physician was discussed  Patient and/or their  primary decision maker expressed understanding  Patient remained stable during entire emergency department stay  Amount and/or Complexity of Data Reviewed  Review and summarize past medical records: yes    Risk of Complications, Morbidity, and/or Mortality  Presenting problems: low  Diagnostic procedures: low  Management options: low    Patient Progress  Patient progress: stable      Disposition  Final diagnoses:   [address] caries     Time reflects when diagnosis was documented in both MDM as applicable and the Disposition within this note     Time User Action Codes Description Comment    2/3/2022  8:18 PM Lisa Arreguin Add [V77 27] Africahumberto Morales     2/3/2022  8:18 PM Lisa Arreguin Add [K02 9] Dental caries       ED Disposition     ED Disposition Condition Date/Time Comment    Discharge Stable u Feb 3, 2022  8:18 PM Mickeal Plan discharge to home/self care              Follow-up Information     Follow up With Specialties Details Why Contact Info Additional [address] [facility] Emergency Department Emergency Medicine Go to  As needed, If symptoms worsen [facility] [address]  [address] [facility] Emergency Department, [address] [place] [address], [id number]    [employer]  Call  To schedule an appointment with a primary care physician [phone]       Ace Mccabe MD  Go to  As needed, If symptoms worsen [address]  Ground Floor  [facility] [address]  [facility] Go to  for follow-up of symptoms [address] [address]  [address], [address], [facility] [place], [address], [phone]          Discharge Medication List as of 2/3/2022  8:23 PM      START taking these medications    Details   naproxen (Naprosyn) 500 mg tablet Take 1 tablet (500 mg total) by mouth 2 (two) times a day with meals, Starting Thu 2/3/2022, Normal         CONTINUE these medications which have NOT CHANGED    Details   emtricitabine-tenofovir (TRUVADA) 200-300 mg per tablet Take 1 tablet by mouth daily, Starting Wed 11/29/2017, Historical Med      ibuprofen (MOTRIN) 600 mg tablet Take 1 tablet (600 mg total) by mouth every 6 (six) hours as needed for mild pain, Starting Mon 9/2/2019, Print      ondansetron (ZOFRAN-ODT) 4 mg disintegrating tablet Take 1 tablet (4 mg total) by mouth every 8 (eight) hours as needed for nausea for up to 10 doses, Starting Wed 12/29/2021, Print             No discharge procedures on file      PDMP Review     None          ED Provider  Electronically Signed by           Micaela Peter PA-C  02/03/22 6035

## 2022-05-09 ENCOUNTER — HOSPITAL ENCOUNTER (EMERGENCY)
Facility: HOSPITAL | Age: 27
Discharge: HOME | End: 2022-05-09
Attending: EMERGENCY MEDICINE | Admitting: EMERGENCY MEDICINE
Payer: COMMERCIAL

## 2022-05-09 VITALS
OXYGEN SATURATION: 99 % | TEMPERATURE: 98.3 F | BODY MASS INDEX: 26.05 KG/M2 | WEIGHT: 147.05 LBS | RESPIRATION RATE: 16 BRPM | SYSTOLIC BLOOD PRESSURE: 139 MMHG | HEART RATE: 50 BPM | DIASTOLIC BLOOD PRESSURE: 70 MMHG

## 2022-05-09 DIAGNOSIS — R11.10 VOMITING: ICD-10-CM

## 2022-05-09 DIAGNOSIS — R11.0 NAUSEA: Primary | ICD-10-CM

## 2022-05-09 PROCEDURE — 99283 EMERGENCY DEPT VISIT LOW MDM: CPT

## 2022-05-09 PROCEDURE — 99284 EMERGENCY DEPT VISIT MOD MDM: CPT | Performed by: EMERGENCY MEDICINE

## 2022-05-09 RX ORDER — ONDANSETRON 4 MG/1
4 TABLET, ORALLY DISINTEGRATING ORAL ONCE
Status: COMPLETED | OUTPATIENT
Start: 2022-05-09 | End: 2022-05-09

## 2022-05-09 RX ORDER — DICYCLOMINE HCL 20 MG
20 TABLET ORAL 2 TIMES DAILY
Qty: 20 TABLET | Refills: 0 | Status: SHIPPED | OUTPATIENT
Start: 2022-05-09

## 2022-05-09 RX ORDER — FAMOTIDINE 20 MG/1
20 TABLET, FILM COATED ORAL 2 TIMES DAILY
Qty: 30 TABLET | Refills: 0 | Status: SHIPPED | OUTPATIENT
Start: 2022-05-09

## 2022-05-09 RX ORDER — ONDANSETRON 4 MG/1
4 TABLET, ORALLY DISINTEGRATING ORAL EVERY 6 HOURS PRN
Qty: 20 TABLET | Refills: 0 | Status: SHIPPED | OUTPATIENT
Start: 2022-05-09

## 2022-05-09 RX ORDER — FAMOTIDINE 20 MG/1
20 TABLET, FILM COATED ORAL ONCE
Status: COMPLETED | OUTPATIENT
Start: 2022-05-09 | End: 2022-05-09

## 2022-05-09 RX ORDER — DICYCLOMINE HCL 20 MG
20 TABLET ORAL ONCE
Status: COMPLETED | OUTPATIENT
Start: 2022-05-09 | End: 2022-05-09

## 2022-05-09 RX ADMIN — FAMOTIDINE 20 MG: 20 TABLET, FILM COATED ORAL at 22:47

## 2022-05-09 RX ADMIN — ONDANSETRON 4 MG: 4 TABLET, ORALLY DISINTEGRATING ORAL at 22:47

## 2022-05-09 RX ADMIN — DICYCLOMINE HYDROCHLORIDE 20 MG: 20 TABLET ORAL at 22:47

## 2022-05-09 NOTE — Clinical Note
Braxton Majano was seen and treated in our emergency department on 5/9/2022  Diagnosis:     Gary Carlton  may return to work on return date  He may return on this date: 05/10/2022         If you have any questions or concerns, please don't hesitate to call        Gaviota Corral DO    ______________________________           _______________          _______________  Hospital Representative                              Date                                Time

## 2022-05-10 NOTE — ED NOTES
AVS reviewed with pt, pt verbalized understanding of d/c instructions  VSS  Diet recommendations given   Pt left ambulatory with steady gait; alert and oriented      Romi Guallpa RN  05/09/22 8841

## 2022-05-10 NOTE — ED PROVIDER NOTES
Pt Name: Giovanny Sky  MRN: [id number]  [place] 1995  Age/Sex: 27 y o  male  Date of evaluation: 5/9/2022  PCP: Katy Restrepo MD    [address] [place] [address]    Chief Complaint   Patient presents with    Nausea     Pt reports for n/v all day - pt reports stomach pressure -          HPI    Thierry Underwood presents to the Emergency Department complaining of nausea/ vomiting and diarrhea with upper abdominal pain  No fever  This has been ongoing off and on for a while  He feels that there is a dietary component but is unsure from what  The pain is worse at night and first thing in the morning but better during the day  HPI      Past Medical and Surgical History    History reviewed  No pertinent past medical history  Past Surgical History:   Procedure Laterality Date    EYE SURGERY         History reviewed  No pertinent family history  Social History     Tobacco Use    Smoking status: Heavy Tobacco Smoker     Packs/day: 1 00     Types: Cigarettes    Smokeless tobacco: Never Used   Vaping Use    Vaping Use: Never used   Substance Use Topics    Alcohol use: Yes     Comment: rarely     Drug use: Not Currently     Types: Marijuana     Comment: last smoked 6/17/18           Allergies    No Known Allergies    Home Medications    Prior to Admission medications    Medication Sig Start Date End Date Taking?  Authorizing Provider   emtricitabine-tenofovir (TRUVADA) 200-300 mg per tablet Take 1 tablet by mouth daily 11/29/17   Historical Provider, MD   ibuprofen (MOTRIN) 600 mg tablet Take 1 tablet (600 mg total) by mouth every 6 (six) hours as needed for mild pain  Patient not taking: Reported on 2/20/2020 9/2/19   Claude Blend, DO   naproxen (Naprosyn) 500 mg tablet Take 1 tablet (500 mg total) by mouth 2 (two) times a day with meals 2/3/22   Wilberto Lion PA-C   ondansetron (ZOFRAN-ODT) 4 mg disintegrating tablet Take 1 tablet (4 mg total) by mouth every 8 (eight) hours as needed for nausea for up to 10 doses 12/29/21   Rhianna Fernandes DO           Review of Systems    Review of Systems   Constitutional: Negative for chills and fever  HENT: Negative for ear pain and sore throat  Eyes: Negative for pain and visual disturbance  Respiratory: Negative for cough and shortness of breath  Cardiovascular: Negative for chest pain and palpitations  Gastrointestinal: Positive for abdominal pain, diarrhea, nausea and vomiting  Genitourinary: Negative for dysuria and hematuria  Musculoskeletal: Negative for arthralgias and back pain  Skin: Negative for color change and rash  Neurological: Negative for seizures and syncope  All other systems reviewed and are negative  Physical Exam      ED Triage Vitals [05/09/22 2055]   Temperature Pulse Respirations Blood Pressure SpO2   98 3 °F (36 8 °C) (!) 114 16 155/74 97 %      Temp Source Heart Rate Source Patient Position - Orthostatic VS BP Location FiO2 (%)   Oral Monitor Sitting Right arm --      Pain Score       --               Physical Exam  Vitals and nursing note reviewed  Constitutional:       General: He is not in acute distress  Appearance: He is well-developed  He is not diaphoretic  HENT:      Head: Normocephalic and atraumatic  Nose: Nose normal    Eyes:      Conjunctiva/sclera: Conjunctivae normal       Pupils: Pupils are equal, round, and reactive to light  Cardiovascular:      Rate and Rhythm: Normal rate and regular rhythm  Heart sounds: Normal heart sounds  No murmur heard  No friction rub  No gallop  Pulmonary:      Effort: Pulmonary effort is normal  No respiratory distress  Breath sounds: Normal breath sounds  No wheezing or rales  Abdominal:      General: Bowel sounds are normal       Palpations: Abdomen is soft  Tenderness: There is no abdominal tenderness  There is no guarding or rebound  Musculoskeletal:         General: Normal range of motion        Cervical back: Normal range of motion and neck supple  Skin:     General: Skin is warm and dry  Neurological:      Mental Status: He is alert and oriented to person, place, and time  Psychiatric:         Behavior: Behavior normal            Assessment and Plan    Francois Livingston is a 27 y o  male who presents with nausea and vomiting  Physical examination unremarkable  Plan will be to perform diagnostic testing and treat symptomatically  MDM    Diagnostic Results        Labs:        Procedure    Procedures      ED Course of Care and Re-Assessments    Medications   ondansetron (ZOFRAN-ODT) dispersible tablet 4 mg (4 mg Oral Given 5/9/22 2247)   famotidine (PEPCID) tablet 20 mg (20 mg Oral Given 5/9/22 2247)   dicyclomine (BENTYL) tablet 20 mg (20 mg Oral Given 5/9/22 2247)     Patient was feeling better after meds  Requested work note  We discussed GI follow up  FINAL IMPRESSION    Final diagnoses:   Nausea   Vomiting         DISPOSITION/PLAN    Time reflects when diagnosis was documented in both MDM as applicable and the Disposition within this note     Time User Action Codes Description Comment    5/9/2022 10:40 PM Carvel Diesel L Add [R11 0] Nausea     5/9/2022 10:40 PM Carvel Diesel Add [R11 10] Vomiting       ED Disposition     ED Disposition Condition Date/Time Comment    Discharge Stable Mon May 9, 2022 10:42 PM Francois Livingston discharge to home/self care              Follow-up Information     Follow up With Specialties Details Why Contact Info Additional Information    Andrey Johns MD  Schedule an appointment as soon as possible for a visit   [address] [place] [phone]  [address] [place] [address]  [facility] [address] [facility] Schedule an appointment as soon as possible for a visit   [address]  [address] [facility] [address]  Bipin Adan [address] [facility] [address], [address], [address], [address], [place], [address] [phone]            PATIENT REFERRED TO:    Tom Stoner MD  [facility] [address]  [facility] [address] [phone]    Schedule an appointment as soon as possible for a visit       [facility] [address] [address] [phone]  Schedule an appointment as soon as possible for a visit         DISCHARGE MEDICATIONS:    Discharge Medication List as of 5/9/2022 10:42 PM      START taking these medications    Details   dicyclomine (BENTYL) 20 mg tablet Take 1 tablet (20 mg total) by mouth 2 (two) times a day, Starting Mon 5/9/2022, Normal      famotidine (PEPCID) 20 mg tablet Take 1 tablet (20 mg total) by mouth 2 (two) times a day, Starting Mon 5/9/2022, Normal      !! ondansetron (Zofran ODT) 4 mg disintegrating tablet Take 1 tablet (4 mg total) by mouth every 6 (six) hours as needed for nausea or vomiting, Starting Mon 5/9/2022, Normal       !! - Potential duplicate medications found  Please discuss with provider  CONTINUE these medications which have NOT CHANGED    Details   emtricitabine-tenofovir (TRUVADA) 200-300 mg per tablet Take 1 tablet by mouth daily, Starting Wed 11/29/2017, Historical Med      ibuprofen (MOTRIN) 600 mg tablet Take 1 tablet (600 mg total) by mouth every 6 (six) hours as needed for mild pain, Starting Mon 9/2/2019, Print      naproxen (Naprosyn) 500 mg tablet Take 1 tablet (500 mg total) by mouth 2 (two) times a day with meals, Starting Thu 2/3/2022, Normal      !! ondansetron (ZOFRAN-ODT) 4 mg disintegrating tablet Take 1 tablet (4 mg total) by mouth every 8 (eight) hours as needed for nausea for up to 10 doses, Starting Wed 12/29/2021, Print       !! - Potential duplicate medications found  Please discuss with provider  No discharge procedures on file           Lui Mullins, [address] [place] [address], DO  05/10/22 9719

## 2022-09-29 ENCOUNTER — APPOINTMENT (OUTPATIENT)
Dept: RADIOLOGY | Facility: HOSPITAL | Age: 27
End: 2022-09-29
Payer: COMMERCIAL

## 2022-09-29 ENCOUNTER — HOSPITAL ENCOUNTER (EMERGENCY)
Facility: HOSPITAL | Age: 27
Discharge: HOME | End: 2022-09-29
Attending: EMERGENCY MEDICINE
Payer: COMMERCIAL

## 2022-09-29 VITALS
WEIGHT: 139.99 LBS | SYSTOLIC BLOOD PRESSURE: 137 MMHG | BODY MASS INDEX: 24.8 KG/M2 | DIASTOLIC BLOOD PRESSURE: 78 MMHG | HEART RATE: 71 BPM | OXYGEN SATURATION: 99 % | RESPIRATION RATE: 20 BRPM | TEMPERATURE: 99.2 F

## 2022-09-29 DIAGNOSIS — J06.9 VIRAL URI WITH COUGH: Primary | ICD-10-CM

## 2022-09-29 DIAGNOSIS — J02.0 STREP THROAT: ICD-10-CM

## 2022-09-29 LAB — S PYO DNA THROAT QL NAA+PROBE: DETECTED

## 2022-09-29 PROCEDURE — 99283 EMERGENCY DEPT VISIT LOW MDM: CPT

## 2022-09-29 PROCEDURE — 99284 EMERGENCY DEPT VISIT MOD MDM: CPT | Performed by: PHYSICIAN ASSISTANT

## 2022-09-29 PROCEDURE — 87651 STREP A DNA AMP PROBE: CPT | Performed by: PHYSICIAN ASSISTANT

## 2022-09-29 PROCEDURE — 71045 X-RAY EXAM CHEST 1 VIEW: CPT

## 2022-09-29 PROCEDURE — 87636 SARSCOV2 & INF A&B AMP PRB: CPT | Performed by: PHYSICIAN ASSISTANT

## 2022-09-29 RX ORDER — ACETAMINOPHEN 325 MG/1
650 TABLET ORAL ONCE
Status: COMPLETED | OUTPATIENT
Start: 2022-09-29 | End: 2022-09-29

## 2022-09-29 RX ORDER — AMOXICILLIN 500 MG/1
500 CAPSULE ORAL EVERY 12 HOURS SCHEDULED
Qty: 20 CAPSULE | Refills: 0 | Status: SHIPPED | OUTPATIENT
Start: 2022-09-29 | End: 2022-10-09

## 2022-09-29 RX ORDER — IBUPROFEN 400 MG/1
400 TABLET ORAL EVERY 6 HOURS PRN
Qty: 15 TABLET | Refills: 0 | Status: SHIPPED | OUTPATIENT
Start: 2022-09-29

## 2022-09-29 RX ORDER — FLUTICASONE PROPIONATE 50 MCG
1 SPRAY, SUSPENSION (ML) NASAL DAILY
Qty: 16 G | Refills: 0 | Status: SHIPPED | OUTPATIENT
Start: 2022-09-29

## 2022-09-29 RX ORDER — ACETAMINOPHEN 325 MG/1
650 TABLET ORAL EVERY 6 HOURS PRN
Qty: 15 TABLET | Refills: 0 | Status: SHIPPED | OUTPATIENT
Start: 2022-09-29 | End: 2022-10-12 | Stop reason: SDUPTHER

## 2022-09-29 RX ADMIN — ACETAMINOPHEN 650 MG: 325 TABLET, FILM COATED ORAL at 17:31

## 2022-09-29 NOTE — ED PROVIDER NOTES
History  Chief Complaint   Patient presents with    Cough     Cough, runny nose, starting today     Patient is a 25 y/o male with no significant PMH who presents for evaluation of URI symptoms  Patient states symptoms started last night and were worse today  He complains of runny nose/congestion, productive cough, sore throat, decreased appetite, headache, bodyaches, and subjective fever/chills  He states chest wall pain only with coughing  He has not taken any medications for his symptoms  He states he was staying with his friend who's children were sick recently  Also works in customer service around people  No known covid exposures  He has received the covid vaccine  He denies shortness of breath, wheezing, stridor, abdominal pain, nausea, vomiting, diarrhea, ear pain, difficulty swallowing, leg swelling, rash, change in urination or bowel habits  Prior to Admission Medications   Prescriptions Last Dose Informant Patient Reported?  Taking?   dicyclomine (BENTYL) 20 mg tablet   No No   Sig: Take 1 tablet (20 mg total) by mouth 2 (two) times a day   emtricitabine-tenofovir (TRUVADA) 200-300 mg per tablet   Yes No   Sig: Take 1 tablet by mouth daily   famotidine (PEPCID) 20 mg tablet   No No   Sig: Take 1 tablet (20 mg total) by mouth 2 (two) times a day   ibuprofen (MOTRIN) 600 mg tablet   No No   Sig: Take 1 tablet (600 mg total) by mouth every 6 (six) hours as needed for mild pain   Patient not taking: Reported on 2/20/2020   naproxen (Naprosyn) 500 mg tablet   No No   Sig: Take 1 tablet (500 mg total) by mouth 2 (two) times a day with meals   ondansetron (ZOFRAN-ODT) 4 mg disintegrating tablet   No No   Sig: Take 1 tablet (4 mg total) by mouth every 8 (eight) hours as needed for nausea for up to 10 doses   ondansetron (Zofran ODT) 4 mg disintegrating tablet   No No   Sig: Take 1 tablet (4 mg total) by mouth every 6 (six) hours as needed for nausea or vomiting      Facility-Administered Medications: None       History reviewed  No pertinent past medical history  Past Surgical History:   Procedure Laterality Date    EYE SURGERY         History reviewed  No pertinent family history  I have reviewed and agree with the history as documented  E-Cigarette/Vaping    E-Cigarette Use Never User      E-Cigarette/Vaping Substances     Social History     Tobacco Use    Smoking status: Heavy Tobacco Smoker     Packs/day: 1 00     Types: Cigarettes    Smokeless tobacco: Never Used   Vaping Use    Vaping Use: Never used   Substance Use Topics    Alcohol use: Yes     Comment: rarely     Drug use: Not Currently     Types: Marijuana     Comment: last smoked 6/17/18       Review of Systems   Constitutional: Positive for appetite change, chills and fever  HENT: Positive for congestion, rhinorrhea and sore throat  Negative for ear pain  Respiratory: Positive for cough  Negative for shortness of breath  Cardiovascular: Negative for leg swelling  Gastrointestinal: Negative for abdominal pain, diarrhea, nausea and vomiting  Genitourinary: Negative for decreased urine volume and difficulty urinating  Musculoskeletal: Positive for myalgias  Skin: Negative for rash  Neurological: Positive for headaches  All other systems reviewed and are negative  Physical Exam  Physical Exam  Vitals and nursing note reviewed  Constitutional:       General: He is not in acute distress  Appearance: Normal appearance  He is normal weight  He is not ill-appearing, toxic-appearing or diaphoretic  HENT:      Head: Normocephalic and atraumatic  Right Ear: Tympanic membrane, ear canal and external ear normal       Left Ear: Tympanic membrane, ear canal and external ear normal       Nose: Congestion and rhinorrhea present  Mouth/Throat:      Lips: Pink  No lesions  Mouth: Mucous membranes are moist       Pharynx: Oropharynx is clear  Uvula midline  Posterior oropharyngeal erythema present   No pharyngeal swelling, oropharyngeal exudate or uvula swelling  Tonsils: No tonsillar exudate or tonsillar abscesses  Eyes:      Conjunctiva/sclera: Conjunctivae normal    Cardiovascular:      Rate and Rhythm: Normal rate and regular rhythm  Heart sounds: Normal heart sounds  No murmur heard  Pulmonary:      Effort: Pulmonary effort is normal  No respiratory distress  Breath sounds: Normal breath sounds  No stridor  No wheezing or rhonchi  Abdominal:      General: Abdomen is flat  Bowel sounds are normal  There is no distension  Palpations: Abdomen is soft  Tenderness: There is no abdominal tenderness  There is no guarding  Musculoskeletal:         General: Normal range of motion  Cervical back: Normal range of motion and neck supple  No rigidity  Skin:     General: Skin is warm and dry  Neurological:      Mental Status: He is alert  Psychiatric:         Mood and Affect: Mood normal          Vital Signs  ED Triage Vitals [09/29/22 1633]   Temperature Pulse Respirations Blood Pressure SpO2   98 8 °F (37 1 °C) 104 20 (!) 173/81 99 %      Temp Source Heart Rate Source Patient Position - Orthostatic VS BP Location FiO2 (%)   Oral Monitor Sitting Right arm --      Pain Score       --           Vitals:    09/29/22 1633 09/29/22 1806   BP: (!) 173/81 137/78   Pulse: 104 71   Patient Position - Orthostatic VS: Sitting Sitting         Visual Acuity      ED Medications  Medications   acetaminophen (TYLENOL) tablet 650 mg (650 mg Oral Given 9/29/22 1731)       Diagnostic Studies  Results Reviewed     Procedure Component Value Units Date/Time    Strep A PCR [[id number]]  (Abnormal) Collected: 09/29/22 1731    Lab Status: Final result Specimen: Throat Updated: 09/29/22 1837     STREP A PCR Detected    FLU/COVID - if FLU clinically relevant [[id number]] Collected: 09/29/22 1731    Lab Status:  In process Specimen: Nares from Nose Updated: 09/29/22 1739                 XR chest 1 view portable (Results Pending)              Procedures  Procedures         ED Course                                             MDM  Number of Diagnoses or Management Options  Viral URI with cough  Diagnosis management comments: Will send COVID-19/ influenza and strep test   Explained to patient that test results can take a few hrs to 24hrs to result and he will be contacted with positiveresults  CXR reviewed by me and interpreted as no acute cardiopulmonary disease  Explained to patient that he needs to self quarantine at home until he gets his results  Explained if strep is positive, he will need abx called into the pharmacy  Discussed follow up with family doctor  Given contact information for the [facility] to schedule appointment if he does not have a doctor  Discussed supportive care for viral URI  Discussed strict return precautions if symptoms worsen or new symptoms arise  Patient states understanding and agrees with plan  Amount and/or Complexity of Data Reviewed  Clinical lab tests: ordered  Tests in the radiology section of CPT®: ordered and reviewed  Independent visualization of images, tracings, or specimens: yes    Patient Progress  Patient progress: stable      Disposition  Final diagnoses:   Viral URI with cough     Time reflects when diagnosis was documented in both MDM as applicable and the Disposition within this note     Time User Action Codes Description Comment    9/29/2022  6:24 PM Sonu Larry Add [J06 9] Viral URI with cough       ED Disposition     ED Disposition   Discharge    Condition   Stable    Date/Time   Thu Sep 29, 2022  6:24 PM    Comment   Kailey Gonzalez discharge to home/self care                 Follow-up Information     Follow up With Specialties Details Why Contact Info Additional Information    Araseli Baugh MD  Schedule an appointment as soon as possible for a visit   [address] [place] [id number]  [address] [place] [address] Internal Medicine Schedule an appointment as soon as possible for a visit in 1 day  [address] [facility] [address]  [address] Schedule an appointment as soon as possible for a visit in 1 day  [address], [address] [facility] [phone]  [address], [address], [address] [place], [place], [address] [facility] Emergency Department Emergency Medicine  If symptoms worsen [facility] [phone]  [address] [facility] Emergency Department, [address] [place] [address], [id number]          Discharge Medication List as of 9/29/2022  6:29 PM      START taking these medications    Details   dextromethorphan-guaifenesin (MUCINEX DM)  MG per 12 hr tablet Take 1 tablet by mouth every 12 (twelve) hours, Starting Thu 9/29/2022, Normal      fluticasone (FLONASE) 50 mcg/act nasal spray 1 spray into each nostril daily, Starting Thu 9/29/2022, Normal         CONTINUE these medications which have NOT CHANGED    Details   dicyclomine (BENTYL) 20 mg tablet Take 1 tablet (20 mg total) by mouth 2 (two) times a day, Starting Mon 5/9/2022, Normal      emtricitabine-tenofovir (TRUVADA) 200-300 mg per tablet Take 1 tablet by mouth daily, Starting Wed 11/29/2017, Historical Med      famotidine (PEPCID) 20 mg tablet Take 1 tablet (20 mg total) by mouth 2 (two) times a day, Starting Mon 5/9/2022, Normal      ibuprofen (MOTRIN) 600 mg tablet Take 1 tablet (600 mg total) by mouth every 6 (six) hours as needed for mild pain, Starting Mon 9/2/2019, Print      naproxen (Naprosyn) 500 mg tablet Take 1 tablet (500 mg total) by mouth 2 (two) times a day with meals, Starting Thu 2/3/2022, Normal      !! ondansetron (Zofran ODT) 4 mg disintegrating tablet Take 1 tablet (4 mg total) by mouth every 6 (six) hours as needed for nausea or vomiting, Starting Mon 5/9/2022, Normal      !! ondansetron (ZOFRAN-ODT) 4 mg disintegrating tablet Take 1 tablet (4 mg total) by mouth every 8 (eight) hours as needed for nausea for up to 10 doses, Starting Wed 12/29/2021, Print       !! - Potential duplicate medications found  Please discuss with provider                PDMP Review     None          ED Provider  Electronically Signed by           Rashard Zavala PA-C  09/29/22 6704

## 2022-09-29 NOTE — Clinical Note
Isrrael Fiore was seen and treated in our emergency department on 9/29/2022  Diagnosis:     Sixto    He may return on this date: Your covid 19/influenza test results are pending  You need to remain quarantined until you get your results  If positive- you need to remain quarantined for 5 days after symptom onset  You may return at that time if you are feeling improved and are able to wear a mask for 5 additional days  If negative- you may return to work once asymptomatic for 24hrs       If you have any questions or concerns, please don't hesitate to call        Kuldip Burciaga PA-C    ______________________________           _______________          _______________  Hospital Representative                              Date                                Time

## 2022-09-30 LAB
FLUAV RNA RESP QL NAA+PROBE: NEGATIVE
FLUBV RNA RESP QL NAA+PROBE: NEGATIVE
SARS-COV-2 RNA RESP QL NAA+PROBE: NEGATIVE

## 2022-10-12 ENCOUNTER — HOSPITAL ENCOUNTER (EMERGENCY)
Facility: HOSPITAL | Age: 27
Discharge: HOME | End: 2022-10-12
Attending: EMERGENCY MEDICINE
Payer: COMMERCIAL

## 2022-10-12 VITALS
DIASTOLIC BLOOD PRESSURE: 74 MMHG | SYSTOLIC BLOOD PRESSURE: 125 MMHG | OXYGEN SATURATION: 98 % | RESPIRATION RATE: 18 BRPM | BODY MASS INDEX: 24.8 KG/M2 | HEART RATE: 78 BPM | TEMPERATURE: 98.6 F | WEIGHT: 139.99 LBS

## 2022-10-12 DIAGNOSIS — K08.89 PAIN, DENTAL: Primary | ICD-10-CM

## 2022-10-12 DIAGNOSIS — B34.9 ACUTE VIRAL SYNDROME: ICD-10-CM

## 2022-10-12 DIAGNOSIS — J06.9 VIRAL URI WITH COUGH: ICD-10-CM

## 2022-10-12 LAB — SARS-COV-2 RNA RESP QL NAA+PROBE: NEGATIVE

## 2022-10-12 PROCEDURE — U0003 INFECTIOUS AGENT DETECTION BY NUCLEIC ACID (DNA OR RNA); SEVERE ACUTE RESPIRATORY SYNDROME CORONAVIRUS 2 (SARS-COV-2) (CORONAVIRUS DISEASE [COVID-19]), AMPLIFIED PROBE TECHNIQUE, MAKING USE OF HIGH THROUGHPUT TECHNOLOGIES AS DESCRIBED BY CMS-2020-01-R: HCPCS | Performed by: PHYSICIAN ASSISTANT

## 2022-10-12 PROCEDURE — 99283 EMERGENCY DEPT VISIT LOW MDM: CPT

## 2022-10-12 PROCEDURE — 99284 EMERGENCY DEPT VISIT MOD MDM: CPT | Performed by: PHYSICIAN ASSISTANT

## 2022-10-12 PROCEDURE — 96372 THER/PROPH/DIAG INJ SC/IM: CPT

## 2022-10-12 PROCEDURE — U0005 INFEC AGEN DETEC AMPLI PROBE: HCPCS | Performed by: PHYSICIAN ASSISTANT

## 2022-10-12 RX ORDER — AMOXICILLIN 500 MG/1
500 TABLET, FILM COATED ORAL 2 TIMES DAILY
Qty: 20 TABLET | Refills: 0 | Status: SHIPPED | OUTPATIENT
Start: 2022-10-12 | End: 2022-10-22

## 2022-10-12 RX ORDER — ACETAMINOPHEN 325 MG/1
650 TABLET ORAL EVERY 6 HOURS PRN
Qty: 30 TABLET | Refills: 0 | Status: SHIPPED | OUTPATIENT
Start: 2022-10-12

## 2022-10-12 RX ORDER — NAPROXEN 500 MG/1
500 TABLET ORAL 2 TIMES DAILY WITH MEALS
Qty: 30 TABLET | Refills: 0 | Status: SHIPPED | OUTPATIENT
Start: 2022-10-12

## 2022-10-12 RX ORDER — KETOROLAC TROMETHAMINE 30 MG/ML
15 INJECTION, SOLUTION INTRAMUSCULAR; INTRAVENOUS ONCE
Status: COMPLETED | OUTPATIENT
Start: 2022-10-12 | End: 2022-10-12

## 2022-10-12 RX ADMIN — KETOROLAC TROMETHAMINE 15 MG: 30 INJECTION, SOLUTION INTRAMUSCULAR; INTRAVENOUS at 14:50

## 2022-10-12 NOTE — ED PROVIDER NOTES
History  Chief Complaint   Patient presents with   • Dental Pain     Missing teeth on b/l sides  Reports toothache and headache beginning this morning  Ibuprofen last taken at 0930     27y  o male with no significant PMH presents to the ER for right lower and left upper dental pain for 1 day  Patient denies taking any medication for symptoms  He describes his pain as throbbing and non-radiating  Pain is constant  He does not have a dentist  He also reports headache and body aches for a few days  He denies sick contacts or recent travel  He denies fever, chills, URI symptoms, chest pain, dyspnea, N/V/D, abdominal pain, weakness or paresthesias  History provided by:  Patient   used: No        Prior to Admission Medications   Prescriptions Last Dose Informant Patient Reported?  Taking?   acetaminophen (TYLENOL) 325 mg tablet   No No   Sig: Take 2 tablets (650 mg total) by mouth every 6 (six) hours as needed for mild pain, headaches or fever   acetaminophen (TYLENOL) 325 mg tablet   No Yes   Sig: Take 2 tablets (650 mg total) by mouth every 6 (six) hours as needed for mild pain, headaches or fever   dextromethorphan-guaifenesin (MUCINEX DM)  MG per 12 hr tablet   No No   Sig: Take 1 tablet by mouth every 12 (twelve) hours   dicyclomine (BENTYL) 20 mg tablet   No No   Sig: Take 1 tablet (20 mg total) by mouth 2 (two) times a day   emtricitabine-tenofovir (TRUVADA) 200-300 mg per tablet   Yes No   Sig: Take 1 tablet by mouth daily   famotidine (PEPCID) 20 mg tablet   No No   Sig: Take 1 tablet (20 mg total) by mouth 2 (two) times a day   fluticasone (FLONASE) 50 mcg/act nasal spray   No No   Si spray into each nostril daily   ibuprofen (MOTRIN) 400 mg tablet   No No   Sig: Take 1 tablet (400 mg total) by mouth every 6 (six) hours as needed for mild pain, fever or headaches   ibuprofen (MOTRIN) 600 mg tablet   No No   Sig: Take 1 tablet (600 mg total) by mouth every 6 (six) hours as needed for mild pain   Patient not taking: Reported on 2/20/2020   naproxen (Naprosyn) 500 mg tablet   No No   Sig: Take 1 tablet (500 mg total) by mouth 2 (two) times a day with meals   ondansetron (ZOFRAN-ODT) 4 mg disintegrating tablet   No No   Sig: Take 1 tablet (4 mg total) by mouth every 8 (eight) hours as needed for nausea for up to 10 doses   ondansetron (Zofran ODT) 4 mg disintegrating tablet   No No   Sig: Take 1 tablet (4 mg total) by mouth every 6 (six) hours as needed for nausea or vomiting      Facility-Administered Medications: None       History reviewed  No pertinent past medical history  Past Surgical History:   Procedure Laterality Date   • EYE SURGERY         History reviewed  No pertinent family history  I have reviewed and agree with the history as documented  E-Cigarette/Vaping   • E-Cigarette Use Never User      E-Cigarette/Vaping Substances     Social History     Tobacco Use   • Smoking status: Heavy Tobacco Smoker     Packs/day: 0 50     Types: Cigarettes   • Smokeless tobacco: Never Used   Vaping Use   • Vaping Use: Never used   Substance Use Topics   • Alcohol use: Yes     Comment: rarely    • Drug use: Not Currently     Types: Marijuana     Comment: last smoked 6/17/18       Review of Systems   Constitutional: Negative for activity change, appetite change, chills and fever  HENT: Positive for dental problem  Negative for congestion, drooling, ear discharge, ear pain, facial swelling, rhinorrhea and sore throat  Eyes: Negative for redness  Respiratory: Negative for cough and shortness of breath  Cardiovascular: Negative for chest pain  Gastrointestinal: Negative for abdominal pain, diarrhea, nausea and vomiting  Musculoskeletal: Positive for myalgias  Negative for neck stiffness  Skin: Negative for rash  Allergic/Immunologic: Negative for food allergies  Neurological: Positive for headaches  Negative for weakness and numbness         Physical Exam  Physical Exam  Vitals and nursing note reviewed  Constitutional:       General: He is not in acute distress  Appearance: He is not toxic-appearing  HENT:      Head: Normocephalic and atraumatic  Mouth/Throat:      Lips: Pink  No lesions  Mouth: Mucous membranes are moist       Dentition: Abnormal dentition  Dental tenderness and gingival swelling present  Pharynx: Oropharynx is clear  Uvula midline  No pharyngeal swelling, oropharyngeal exudate, posterior oropharyngeal erythema or uvula swelling  Tonsils: No tonsillar exudate or tonsillar abscesses  Eyes:      Conjunctiva/sclera: Conjunctivae normal    Neck:      Trachea: No tracheal deviation  Cardiovascular:      Rate and Rhythm: Normal rate  Pulmonary:      Effort: Pulmonary effort is normal  No respiratory distress  Abdominal:      General: There is no distension  Musculoskeletal:      Cervical back: Normal range of motion and neck supple  Skin:     General: Skin is warm and dry  Findings: No rash  Neurological:      Mental Status: He is alert  GCS: GCS eye subscore is 4  GCS verbal subscore is 5  GCS motor subscore is 6     Psychiatric:         Mood and Affect: Mood normal          Vital Signs  ED Triage Vitals [10/12/22 1404]   Temperature Pulse Respirations Blood Pressure SpO2   98 6 °F (37 °C) 78 18 125/74 98 %      Temp Source Heart Rate Source Patient Position - Orthostatic VS BP Location FiO2 (%)   Oral Monitor Sitting Right arm --      Pain Score       7           Vitals:    10/12/22 1404   BP: 125/74   Pulse: 78   Patient Position - Orthostatic VS: Sitting         Visual Acuity  Visual Acuity    Flowsheet Row Most Recent Value   L Pupil Size (mm) 3   R Pupil Size (mm) 3          ED Medications  Medications   ketorolac (TORADOL) injection 15 mg (15 mg Intramuscular Given 10/12/22 1450)       Diagnostic Studies  Results Reviewed     Procedure Component Value Units Date/Time    COVID only [[id number]]  (Normal) Collected: 10/12/22 1438    Lab Status: Final result Specimen: Nares from Nose Updated: 10/12/22 1517     SARS-CoV-2 Negative    Narrative:      FOR PEDIATRIC PATIENTS - copy/paste COVID Guidelines URL to browser: [email or web address]  [email or web address]    SARS-CoV-2 assay is a Nucleic Acid Amplification assay intended for the  qualitative detection of nucleic acid from SARS-CoV-2 in nasopharyngeal  swabs  Results are for the presumptive identification of SARS-CoV-2 RNA  Positive results are indicative of infection with SARS-CoV-2, the virus  causing COVID-19, but do not rule out bacterial infection or co-infection  with other viruses  Laboratories within the [place] and its  territories are required to report all positive results to the appropriate  public health authorities  Negative results do not preclude SARS-CoV-2  infection and should not be used as the sole basis for treatment or other  patient management decisions  Negative results must be combined with  clinical observations, patient history, and epidemiological information  This test has not been FDA cleared or approved  This test has been authorized by FDA under an Emergency Use Authorization  (EUA)  This test is only authorized for the duration of time the  declaration that circumstances exist justifying the authorization of the  emergency use of an in vitro diagnostic tests for detection of SARS-CoV-2  virus and/or diagnosis of COVID-19 infection under section 564(b)(1) of  the Act, 21 U  S C  465AHU-7(J)(2), unless the authorization is terminated  or revoked sooner  The test has been validated but independent review by FDA  and CLIA is pending  Test performed using [employer] GeneXpert: This RT-PCR assay targets N2,  a region unique to SARS-CoV-2  A conserved region in the E-gene was chosen  for pan-Sarbecovirus detection which includes SARS-CoV-2      According to CMS-2020-01-R, this platform meets the definition of high-throughput technology  No orders to display              Procedures  Procedures         ED Course                               SBIRT 20yo+    Flowsheet Row Most Recent Value   SBIRT (25 yo +)    In order to provide better care to our patients, we are screening all of our patients for alcohol and drug use  Would it be okay to ask you these screening questions? No Filed at: 10/12/2022 1431   Initial Alcohol Screen: US AUDIT-C     1  How often do you have a drink containing alcohol? 0 Filed at: 10/12/2022 1431   2  How many drinks containing alcohol do you have on a typical day you are drinking? 0 Filed at: 10/12/2022 1431   3a  Male UNDER 65: How often do you have five or more drinks on one occasion? 0 Filed at: 10/12/2022 1431   3b  FEMALE Any Age, or MALE 65+: How often do you have 4 or more drinks on one occassion? 0 Filed at: 10/12/2022 1431   Audit-C Score 0 Filed at: 10/12/2022 1431   BRIONNA: How many times in the past year have you    Used an illegal drug or used a prescription medication for non-medical reasons? Never Filed at: 10/12/2022 1431                    MDM  Number of Diagnoses or Management Options  Acute viral syndrome: new and requires workup  Pain, dental: new and does not require workup  Diagnosis management comments:     27y  o male presents to the ER for dental pain, headache and body aches  No floor of the mouth pain or swelling  No trouble handling secretions or trismus  Will hold off on labs and imaging  Will check a covid test  Will discharge patient prior to test resulting  Will call patient if test is positive  Patient agreeable  Will discharge  The management plan was discussed in detail with the patient at bedside and all questions were answered  Prior to discharge, we provided both verbal and written instructions  We discussed with the patient the signs and symptoms for which to return to the emergency department    All questions were answered and patient was comfortable with the plan of care and discharged to home  Instructed the patient to follow up with the primary care provider and/or specialist provided and their written instructions  The patient verbalized understanding of our discussion and plan of care, and agrees to return to the Emergency Department for concerns and progression of illness  At discharge, I instructed the patient to:  -follow up with pcp  -take Tylenol, Naproxen and Amoxicillin as prescribed  -rest and drink plenty of fluids  -follow up with the recommended dentist  -return to the ER if symptoms worsened or new symptoms arose  Patient agreed to this plan and was stable at time of discharge  Amount and/or Complexity of Data Reviewed  Clinical lab tests: ordered and reviewed    Patient Progress  Patient progress: stable      Disposition  Final diagnoses:   Pain, dental   Acute viral syndrome     Time reflects when diagnosis was documented in both MDM as applicable and the Disposition within this note     Time User Action Codes Description Comment    10/12/2022  2:36 PM Morena ESCOBAR Add [K08 89] Pain, dental     10/12/2022  2:36 PM Morena ESCOBAR Add [B34 9] Acute viral syndrome     10/12/2022  2:37 PM Morena ESCOBAR Add [J06 9] Viral URI with cough       ED Disposition     ED Disposition   Discharge    Condition   Stable    Date/Time   Wed Oct 12, 2022  2:36 PM    Comment   Gladies Deter discharge to home/self care                 Follow-up Information     Follow up With Specialties Details Why Contact Info    Jackie Navarro MD  Schedule an appointment as soon as possible for a visit   [address] [place] [phone]  [facility]  Schedule an appointment as soon as possible for a visit   [address]  [address]  [phone]          Discharge Medication List as of 10/12/2022  2:42 PM      START taking these medications Details   amoxicillin (AMOXIL) 500 MG tablet Take 1 tablet (500 mg total) by mouth 2 (two) times a day for 10 days, Starting Wed 10/12/2022, Until Sat 10/22/2022, Normal      !! naproxen (Naprosyn) 500 mg tablet Take 1 tablet (500 mg total) by mouth 2 (two) times a day with meals, Starting Wed 10/12/2022, Normal       !! - Potential duplicate medications found  Please discuss with provider        CONTINUE these medications which have CHANGED    Details   acetaminophen (TYLENOL) 325 mg tablet Take 2 tablets (650 mg total) by mouth every 6 (six) hours as needed for mild pain, headaches or fever, Starting Wed 10/12/2022, Normal         CONTINUE these medications which have NOT CHANGED    Details   dextromethorphan-guaifenesin (MUCINEX DM)  MG per 12 hr tablet Take 1 tablet by mouth every 12 (twelve) hours, Starting Thu 9/29/2022, Normal      dicyclomine (BENTYL) 20 mg tablet Take 1 tablet (20 mg total) by mouth 2 (two) times a day, Starting Mon 5/9/2022, Normal      emtricitabine-tenofovir (TRUVADA) 200-300 mg per tablet Take 1 tablet by mouth daily, Starting Wed 11/29/2017, Historical Med      famotidine (PEPCID) 20 mg tablet Take 1 tablet (20 mg total) by mouth 2 (two) times a day, Starting Mon 5/9/2022, Normal      fluticasone (FLONASE) 50 mcg/act nasal spray 1 spray into each nostril daily, Starting Thu 9/29/2022, Normal      !! ibuprofen (MOTRIN) 400 mg tablet Take 1 tablet (400 mg total) by mouth every 6 (six) hours as needed for mild pain, fever or headaches, Starting Thu 9/29/2022, Normal      !! ibuprofen (MOTRIN) 600 mg tablet Take 1 tablet (600 mg total) by mouth every 6 (six) hours as needed for mild pain, Starting Mon 9/2/2019, Print      !! naproxen (Naprosyn) 500 mg tablet Take 1 tablet (500 mg total) by mouth 2 (two) times a day with meals, Starting Thu 2/3/2022, Normal      !! ondansetron (Zofran ODT) 4 mg disintegrating tablet Take 1 tablet (4 mg total) by mouth every 6 (six) hours as needed for nausea or vomiting, Starting Mon 5/9/2022, Normal      !! ondansetron (ZOFRAN-ODT) 4 mg disintegrating tablet Take 1 tablet (4 mg total) by mouth every 8 (eight) hours as needed for nausea for up to 10 doses, Starting Wed 12/29/2021, Print       !! - Potential duplicate medications found  Please discuss with provider  No discharge procedures on file      PDMP Review     None          ED Provider  Electronically Signed by           Meghana Ly PA-C  10/12/22 2798

## 2022-10-12 NOTE — Clinical Note
Radha Barajas was seen and treated in our emergency department on 10/12/2022  No restrictions            Diagnosis:     Elizabeth Valdovinos  may return to work on return date  He may return on this date: 10/13/2022         If you have any questions or concerns, please don't hesitate to call        Carlos Bravo RN    ______________________________           _______________          _______________  Hospital Representative                              Date                                Time

## 2022-10-12 NOTE — Clinical Note
Fani Rodarte was seen and treated in our emergency department on 10/12/2022  No restrictions            Diagnosis:     Jennifer Fletcher  may return to work on return date  He may return on this date: 10/13/2022         If you have any questions or concerns, please don't hesitate to call        Courtney Sterling RN    ______________________________           _______________          _______________  Hospital Representative                              Date                                Time

## 2022-10-12 NOTE — Clinical Note
Isrrael Fiore was seen and treated in our emergency department on 10/12/2022  No restrictions            Diagnosis:     Ruthy Carlos  may return to work on return date  He may return on this date: 10/13/2022         If you have any questions or concerns, please don't hesitate to call        Maryanne Lagunas RN    ______________________________           _______________          _______________  Hospital Representative                              Date                                Time

## 2023-05-02 ENCOUNTER — HOSPITAL ENCOUNTER (EMERGENCY)
Facility: HOSPITAL | Age: 28
Discharge: HOME | End: 2023-05-02
Attending: EMERGENCY MEDICINE

## 2023-05-02 VITALS
RESPIRATION RATE: 18 BRPM | DIASTOLIC BLOOD PRESSURE: 99 MMHG | OXYGEN SATURATION: 100 % | SYSTOLIC BLOOD PRESSURE: 171 MMHG | HEART RATE: 81 BPM | TEMPERATURE: 98.2 F

## 2023-05-02 DIAGNOSIS — K08.89 PAIN, DENTAL: ICD-10-CM

## 2023-05-02 DIAGNOSIS — K21.9 GERD (GASTROESOPHAGEAL REFLUX DISEASE): Primary | ICD-10-CM

## 2023-05-02 DIAGNOSIS — K62.89 RECTAL PAIN: ICD-10-CM

## 2023-05-02 RX ORDER — FAMOTIDINE 20 MG/1
20 TABLET, FILM COATED ORAL 2 TIMES DAILY
Qty: 30 TABLET | Refills: 0 | Status: SHIPPED | OUTPATIENT
Start: 2023-05-02

## 2023-05-02 RX ORDER — FAMOTIDINE 20 MG/1
20 TABLET, FILM COATED ORAL ONCE
Status: COMPLETED | OUTPATIENT
Start: 2023-05-02 | End: 2023-05-02

## 2023-05-02 RX ORDER — KETOROLAC TROMETHAMINE 30 MG/ML
15 INJECTION, SOLUTION INTRAMUSCULAR; INTRAVENOUS ONCE
Status: COMPLETED | OUTPATIENT
Start: 2023-05-02 | End: 2023-05-02

## 2023-05-02 RX ADMIN — FAMOTIDINE 20 MG: 20 TABLET, FILM COATED ORAL at 22:47

## 2023-05-02 RX ADMIN — KETOROLAC TROMETHAMINE 15 MG: 30 INJECTION, SOLUTION INTRAMUSCULAR; INTRAVENOUS at 22:47

## 2023-05-03 NOTE — ED ATTENDING ATTESTATION
5/2/2023  IMilagros, DO, saw and evaluated the patient  I have discussed the patient with the resident/non-physician practitioner and agree with the resident's/non-physician practitioner's findings, Plan of Care, and MDM as documented in the resident's/non-physician practitioner's note, except where noted  All available labs and Radiology studies were reviewed  I was present for key portions of any procedure(s) performed by the resident/non-physician practitioner and I was immediately available to provide assistance  At this point I agree with the current assessment done in the Emergency Department  I have conducted an independent evaluation of this patient a history and physical is as follows: Melba Sport Medical Decision Making  This is a 27-year-old male who presents emergency department with symptoms of dental pain, has an appointment for with oral surgery in the next several days requesting a referral, the patient also has epigastric abdominal pain with a history of GERD and gastritis does not take any medications as an outpatient  Will prescribe Pepcid for such no abdominal tenderness on examination here in the emergency department, also complaining of some coccyx pain, symptoms are worse after sitting down all day, noted with symptoms at this point time    Plan at this point time follow-up as an outpatient with noted referrals  Pt re-examined and evaluated after testing and treatment  Spoke with the patient and feeling improved and sxs have resolved  Will discharge home with close f/u with pcp and instructed to return to the ED if sxs worsen or continue  Pt agrees with the plan for discharge and feels comfortable to go home with proper f/u  Advised to return for worsening or additional problems  Diagnostic tests were reviewed and questions answered  Diagnosis, care plan and treatment options were discussed  The patient understand instructions and will follow up as directed    Counseling: I had a detailed discussion with the patient and/or guardian regarding: the historical points, exam findings, and any diagnostic results supporting the discharge diagnosis, lab results, radiology results, discharge instructions reviewed with patient and/or family/caregiver and understanding was verbalized  Instructions given to return to the emergency department if symptoms worsen or persist, or if there are any questions or concerns that arise at home  All labs reviewed and utilized in the medical decision making process  All radiology studies independently viewed by me and interpreted by the radiologist     Risk  Prescription drug management            All labs reviewed and interpreted by myself   All radiology studies independently viewed by me and interpreted by myself     No orders to display       Labs Reviewed - No data to display    Clinical Impression:    Final diagnoses:   GERD (gastroesophageal reflux disease)   Pain, dental   Rectal pain           ED Course         Critical Care Time  Procedures

## 2023-05-03 NOTE — DISCHARGE INSTRUCTIONS
Please take Pepcid for your abdominal pain  Please take Motrin and Tylenol for your dental pain  Please follow-up with the oral surgeon and family practice to be seen for your symptoms  Please return to the Emergency Department if develop any new or concerning symptoms including severe abdominal pain, severe vomiting, or difficulty opening her mouth

## 2023-05-03 NOTE — ED PROVIDER NOTES
"History  Chief Complaint   Patient presents with   • Medical Problem     Pt has an appt with oral surgery  Needs a referral for the appt  Feels like his body not in order and is requesting a follow up for colonoscopy  Patient is a 46-year-old male with history of GERD who presents with multiple complaints  Patient states that he has had longstanding dental pain which is most severe in his left upper tooth  He notes that it was fractured a long time ago and that it has been very painful since  He states that he felt a \"bubble\" near the tooth recently but that he thinks that it pops when he brushes teeth  He states that he has an appointment with the oral surgeon for this Thursday but that he needs a referral     Patient is also endorsing epigastric abdominal pain that is chronic  He states that he has been diagnosed with GERD and he has been treated with Pepcid in the past but that he no longer has this medication  He denies any nausea, vomiting, changes to bowel movements, urinary complaints  Patient also endorses rectal pain  He states that he sits for long periods of time at his job which seems to exacerbate the pain  He states that bowel movements also cause some discomfort  He notes that he sometimes has hard stools  He denies any bloody stools  He denies appreciating any masses when wiping  Prior to Admission Medications   Prescriptions Last Dose Informant Patient Reported?  Taking?   acetaminophen (TYLENOL) 325 mg tablet   No No   Sig: Take 2 tablets (650 mg total) by mouth every 6 (six) hours as needed for mild pain, headaches or fever   dextromethorphan-guaifenesin (MUCINEX DM)  MG per 12 hr tablet   No No   Sig: Take 1 tablet by mouth every 12 (twelve) hours   dicyclomine (BENTYL) 20 mg tablet   No No   Sig: Take 1 tablet (20 mg total) by mouth 2 (two) times a day   emtricitabine-tenofovir (TRUVADA) 200-300 mg per tablet   Yes No   Sig: Take 1 tablet by mouth daily " famotidine (PEPCID) 20 mg tablet   No No   Sig: Take 1 tablet (20 mg total) by mouth 2 (two) times a day   fluticasone (FLONASE) 50 mcg/act nasal spray   No No   Si spray into each nostril daily   ibuprofen (MOTRIN) 400 mg tablet   No No   Sig: Take 1 tablet (400 mg total) by mouth every 6 (six) hours as needed for mild pain, fever or headaches   ibuprofen (MOTRIN) 600 mg tablet   No No   Sig: Take 1 tablet (600 mg total) by mouth every 6 (six) hours as needed for mild pain   Patient not taking: Reported on 2020   naproxen (Naprosyn) 500 mg tablet   No No   Sig: Take 1 tablet (500 mg total) by mouth 2 (two) times a day with meals   naproxen (Naprosyn) 500 mg tablet   No No   Sig: Take 1 tablet (500 mg total) by mouth 2 (two) times a day with meals   ondansetron (ZOFRAN-ODT) 4 mg disintegrating tablet   No No   Sig: Take 1 tablet (4 mg total) by mouth every 8 (eight) hours as needed for nausea for up to 10 doses   ondansetron (Zofran ODT) 4 mg disintegrating tablet   No No   Sig: Take 1 tablet (4 mg total) by mouth every 6 (six) hours as needed for nausea or vomiting      Facility-Administered Medications: None       History reviewed  No pertinent past medical history  Past Surgical History:   Procedure Laterality Date   • EYE SURGERY         History reviewed  No pertinent family history  I have reviewed and agree with the history as documented  E-Cigarette/Vaping   • E-Cigarette Use Never User      E-Cigarette/Vaping Substances     Social History     Tobacco Use   • Smoking status: Heavy Smoker     Packs/day: 0 50     Types: Cigarettes   • Smokeless tobacco: Never   Vaping Use   • Vaping Use: Never used   Substance Use Topics   • Alcohol use: Yes     Comment: rarely    • Drug use: Not Currently     Types: Marijuana     Comment: last smoked 20        Review of Systems   Constitutional: Negative for chills and fever  HENT: Positive for dental problem  Negative for ear pain and sore throat  Eyes: Negative for pain and visual disturbance  Respiratory: Negative for cough and shortness of breath  Cardiovascular: Negative for chest pain and palpitations  Gastrointestinal: Positive for abdominal pain and constipation  Negative for diarrhea, nausea and vomiting  Genitourinary: Negative for dysuria and hematuria  Musculoskeletal: Negative for arthralgias and back pain  Skin: Negative for color change and rash  Neurological: Negative for seizures and syncope  All other systems reviewed and are negative  Physical Exam  ED Triage Vitals   Temperature Pulse Respirations Blood Pressure SpO2   05/02/23 2123 05/02/23 2123 05/02/23 2123 05/02/23 2123 05/02/23 2123   98 2 °F (36 8 °C) 81 18 (!) 171/99 100 %      Temp src Heart Rate Source Patient Position - Orthostatic VS BP Location FiO2 (%)   -- -- -- -- --             Pain Score       05/02/23 2247       6             Orthostatic Vital Signs  Vitals:    05/02/23 2123   BP: (!) 171/99   Pulse: 81       Physical Exam  Vitals and nursing note reviewed  Exam conducted with a chaperone present  Constitutional:       General: He is not in acute distress  Appearance: Normal appearance  He is not ill-appearing, toxic-appearing or diaphoretic  HENT:      Head: Normocephalic and atraumatic  Right Ear: External ear normal       Left Ear: External ear normal       Nose: Nose normal  No congestion or rhinorrhea  Mouth/Throat:      Mouth: Mucous membranes are moist       Dentition: Abnormal dentition  Dental tenderness and dental caries present  No gingival swelling, dental abscesses or gum lesions  Pharynx: Oropharynx is clear  No oropharyngeal exudate or posterior oropharyngeal erythema  Comments: Uvula is midline, tongue is not elevated, no woodiness to the submental region  Eyes:      General: No scleral icterus  Right eye: No discharge  Left eye: No discharge     Cardiovascular:      Rate and Rhythm: Normal rate and regular rhythm  Pulses: Normal pulses  Heart sounds: Normal heart sounds  No murmur heard  No friction rub  No gallop  Pulmonary:      Effort: Pulmonary effort is normal  No respiratory distress  Breath sounds: Normal breath sounds  No wheezing or rales  Abdominal:      General: Abdomen is flat  Bowel sounds are normal  There is no distension  Palpations: Abdomen is soft  Tenderness: There is abdominal tenderness (Mild epigastric)  There is no right CVA tenderness, left CVA tenderness or guarding  Genitourinary:     Rectum: Normal    Musculoskeletal:      Cervical back: Normal range of motion and neck supple  Right lower leg: No edema  Left lower leg: No edema  Skin:     General: Skin is warm and dry  Coloration: Skin is not jaundiced or pale  Neurological:      General: No focal deficit present  Mental Status: He is alert and oriented to person, place, and time  Psychiatric:         Mood and Affect: Mood normal          Behavior: Behavior normal          Thought Content: Thought content normal          Judgment: Judgment normal          ED Medications  Medications   ketorolac (TORADOL) injection 15 mg (15 mg Intramuscular Given 5/2/23 2247)   famotidine (PEPCID) tablet 20 mg (20 mg Oral Given 5/2/23 2247)       Diagnostic Studies  Results Reviewed     None                 No orders to display         Procedures  Procedures      ED Course                                       Medical Decision Making  Patient is a 79-year-old male with history of GERD presents with multiple complaints  On exam, patient dentition is poor and he has a tooth fracture likely involving the root  I have low clinical suspicion for periapical abscess and other oral infections  Will provide patient with referral to [facility] surgeons and give a dose of Toradol      Patient's abdominal symptoms most consistent with his baseline GERD which he states is what this feels like  I have very low clinical suspicion for other etiologies including biliary disease, pancreatitis, or peptic ulcer  We will give the patient a dose of Pepcid and provide him a prescription  We will give him referral to family medicine  On rectal exam, patient has no appreciable hemorrhoids or internal masses  Unclear etiology at this point  Does not appear to have any anal fissures or tears  We will refer him for further work-up with PCP  GERD (gastroesophageal reflux disease): chronic illness or injury with exacerbation, progression, or side effects of treatment  Pain, dental: acute illness or injury  Rectal pain: acute illness or injury  Risk  Prescription drug management  Disposition  Final diagnoses:   GERD (gastroesophageal reflux disease)   Pain, dental   Rectal pain     Time reflects when diagnosis was documented in both MDM as applicable and the Disposition within this note     Time User Action Codes Description Comment    5/2/2023 10:40 PM Sarthak Glory [K21 9] GERD (gastroesophageal reflux disease)     5/2/2023 10:41 PM Phuong Coffman Add [K08 89] Pain, dental     5/2/2023 10:41 PM Phuong Coffman Add [K62 89] Rectal pain       ED Disposition     ED Disposition   Discharge    Condition   Stable    Date/Time   Tue May 2, 2023 10:40 PM    Comment   Brandi Peres discharge to home/self care                 Follow-up Information     Follow up With Specialties Details Why Contact Info Additional [address] Emergency Department Emergency Medicine Go to  If symptoms worsen or if you have any other specific concerns [address] [phone] [address] Emergency Department, [address], [place], [place], [address]    Green Push, MD  Call  As needed [address] [place] [id number] [phone]             Discharge Medication List as of 5/2/2023 10:46 PM      START taking these medications    Details   !! famotidine (PEPCID) 20 mg tablet Take 1 tablet (20 mg total) by mouth 2 (two) times a day, Starting Tue 5/2/2023, Print       !! - Potential duplicate medications found  Please discuss with provider        CONTINUE these medications which have NOT CHANGED    Details   acetaminophen (TYLENOL) 325 mg tablet Take 2 tablets (650 mg total) by mouth every 6 (six) hours as needed for mild pain, headaches or fever, Starting Wed 10/12/2022, Normal      dextromethorphan-guaifenesin (MUCINEX DM)  MG per 12 hr tablet Take 1 tablet by mouth every 12 (twelve) hours, Starting Thu 9/29/2022, Normal      dicyclomine (BENTYL) 20 mg tablet Take 1 tablet (20 mg total) by mouth 2 (two) times a day, Starting Mon 5/9/2022, Normal      emtricitabine-tenofovir (TRUVADA) 200-300 mg per tablet Take 1 tablet by mouth daily, Starting Wed 11/29/2017, Historical Med      !! famotidine (PEPCID) 20 mg tablet Take 1 tablet (20 mg total) by mouth 2 (two) times a day, Starting Mon 5/9/2022, Normal      fluticasone (FLONASE) 50 mcg/act nasal spray 1 spray into each nostril daily, Starting Thu 9/29/2022, Normal      !! ibuprofen (MOTRIN) 400 mg tablet Take 1 tablet (400 mg total) by mouth every 6 (six) hours as needed for mild pain, fever or headaches, Starting Thu 9/29/2022, Normal      !! ibuprofen (MOTRIN) 600 mg tablet Take 1 tablet (600 mg total) by mouth every 6 (six) hours as needed for mild pain, Starting Mon 9/2/2019, Print      !! naproxen (Naprosyn) 500 mg tablet Take 1 tablet (500 mg total) by mouth 2 (two) times a day with meals, Starting Thu 2/3/2022, Normal      !! naproxen (Naprosyn) 500 mg tablet Take 1 tablet (500 mg total) by mouth 2 (two) times a day with meals, Starting Wed 10/12/2022, Normal      !! ondansetron (Zofran ODT) 4 mg disintegrating tablet Take 1 tablet (4 mg total) by mouth every 6 (six) hours as needed for nausea or vomiting, Starting Mon 5/9/2022, Normal      !! ondansetron (ZOFRAN-ODT) 4 mg disintegrating tablet Take 1 tablet (4 mg total) by mouth every 8 (eight) hours as needed for nausea for up to 10 doses, Starting Wed 12/29/2021, Print       !! - Potential duplicate medications found  Please discuss with provider  PDMP Review     None           ED Provider  Attending physically available and evaluated Mary Estrella  I managed the patient along with the ED Attending      Electronically Signed by         Dominique Lobato MD  05/03/23 0115

## 2023-05-15 ENCOUNTER — OFFICE VISIT (OUTPATIENT)
Dept: GASTROENTEROLOGY | Facility: CLINIC | Age: 28
End: 2023-05-15

## 2023-05-15 VITALS
BODY MASS INDEX: 23.01 KG/M2 | SYSTOLIC BLOOD PRESSURE: 118 MMHG | DIASTOLIC BLOOD PRESSURE: 70 MMHG | HEIGHT: 64 IN | TEMPERATURE: 98 F | WEIGHT: 134.8 LBS

## 2023-05-15 DIAGNOSIS — K62.89 RECTAL PAIN: ICD-10-CM

## 2023-05-15 DIAGNOSIS — K21.9 GASTROESOPHAGEAL REFLUX DISEASE, UNSPECIFIED WHETHER ESOPHAGITIS PRESENT: ICD-10-CM

## 2023-05-15 DIAGNOSIS — K59.00 CONSTIPATION, UNSPECIFIED CONSTIPATION TYPE: ICD-10-CM

## 2023-05-15 DIAGNOSIS — R10.13 EPIGASTRIC ABDOMINAL PAIN: Primary | ICD-10-CM

## 2023-05-15 NOTE — PROGRESS NOTES
[facility] - Outpatient Consultation  Margarita Jo 24 y o  male MRN: [id number]  Encounter: [id number]          ASSESSMENT AND PLAN:      1  Epigastric abdominal pain  - EGD; Future    2  Rectal pain  - Colonoscopy; Future  - polyethylene glycol (GOLYTELY) 4000 mL solution; Take 4,000 mL by mouth once for 1 dose Follow GI office instructions  Dispense: 4000 mL; Refill: 0    3  Gastroesophageal reflux disease, unspecified whether esophagitis present    4  Constipation, unspecified constipation type  - Colonoscopy; Future  - polyethylene glycol (GOLYTELY) 4000 mL solution; Take 4,000 mL by mouth once for 1 dose Follow GI office instructions  Dispense: 4000 mL; Refill: [de-identified]   42-year-old male with chronic GI complaints leading to multiple ED visits for symptoms including epigastric abdominal pain, GERD, rectal discomfort, constipation  Broad differential for symptoms including peptic ulcer disease, functional dyspepsia, coccyx related pain, rectal STI, irritable bowel syndrome with constipation predominance  Given degree of symptoms, will plan for endoscopic evaluation and plan for gastric and duodenal biopsies  If endoscopically unremarkable, can plan for PPI trial as he has had partial response to H2 blocker for upper GI complaints  He is currently requiring intermittent NSAIDs but these will be discontinued after dental issues addressed  Given constipation, plan for GoLytely bowel prep  Risks and benefits of procedure discussed in detail and he is agreeable to proceed  ______________________________________________________________________    HPI: 42-year-old male presenting for evaluation after recent ED visit for GERD, epigastric abdominal pain, constipation, rectal pain  During his most recent visit he was given Bentyl and Pepcid which did relieve his symptoms somewhat but not completely  He had a rectal exam which was unremarkable    He has had longstanding issues related to both upper GI complaints and rectal discomfort  He is also typically constipated and can go 2 to 3 days without a bowel movement and can sometimes pass hard stool  He took a laxative does not recall which one but this did not completely relieve his constipation  His symptoms have been intermittent and longstanding for the past few years  He has had a few ED visits to address these complaints but has not gotten to the root cause of his symptoms  There is no family history of significant GI conditions, he has never had an EGD or colonoscopy  Denies any prior abdominal surgeries  REVIEW OF SYSTEMS:    CONSTITUTIONAL: Denies any fever, chills, rigors, and weight loss  HEENT: Denies odynophagia, tinnitus  CARDIOVASCULAR: No chest pain or palpitations  RESPIRATORY: Denies any cough, hemoptysis, shortness of breath or dyspnea on exertion  GASTROINTESTINAL: As noted in the History of Present Illness  GENITOURINARY: No problems with urination  Denies any hematuria or dysuria  NEUROLOGIC: No dizziness or vertigo, denies headaches  MUSCULOSKELETAL: Denies any muscle or joint pain  SKIN: Denies skin rashes or itching  ENDOCRINE:  Denies intolerance to heat or cold  PSYCHOSOCIAL: Denies depression or anxiety  Denies any recent memory loss  Historical Information   History reviewed  No pertinent past medical history  Past Surgical History:   Procedure Laterality Date   • EYE SURGERY       Social History   Social History     Substance and Sexual Activity   Alcohol Use Yes    Comment: rarely      Social History     Substance and Sexual Activity   Drug Use Not Currently   • Types: Marijuana    Comment: last smoked 6/17/18     Social History     Tobacco Use   Smoking Status Heavy Smoker   • Packs/day: 0 50   • Types: Cigarettes   Smokeless Tobacco Never     History reviewed  No pertinent family history      Meds/Allergies       Current Outpatient Medications:   •  polyethylene glycol "(GOLYTELY) 4000 mL solution  •  acetaminophen (TYLENOL) 325 mg tablet  •  dextromethorphan-guaifenesin (MUCINEX DM)  MG per 12 hr tablet  •  dicyclomine (BENTYL) 20 mg tablet  •  emtricitabine-tenofovir (TRUVADA) 200-300 mg per tablet  •  famotidine (PEPCID) 20 mg tablet  •  famotidine (PEPCID) 20 mg tablet  •  fluticasone (FLONASE) 50 mcg/act nasal spray  •  ibuprofen (MOTRIN) 400 mg tablet  •  ibuprofen (MOTRIN) 600 mg tablet  •  naproxen (Naprosyn) 500 mg tablet  •  naproxen (Naprosyn) 500 mg tablet  •  ondansetron (Zofran ODT) 4 mg disintegrating tablet  •  ondansetron (ZOFRAN-ODT) 4 mg disintegrating tablet    No Known Allergies        Objective     Blood pressure 118/70, temperature 98 °F (36 7 °C), temperature source Tympanic, height 5' 4\" (1 626 m), weight 61 1 kg (134 lb 12 8 oz)  Body mass index is 23 14 kg/m²  PHYSICAL EXAM:      General Appearance:   Alert, cooperative, no distress   HEENT:   Normocephalic, atraumatic, anicteric  Neck:  Supple, symmetrical, trachea midline   Lungs:   Clear to auscultation bilaterally; no rales, rhonchi or wheezing; respirations unlabored    Heart[de-identified]   Regular rate and rhythm; no murmur, rub, or gallop  Abdomen:   Soft, non-tender, non-distended; normal bowel sounds; no masses, no organomegaly    Genitalia:   Deferred    Rectal:   Deferred    Extremities:  No cyanosis, clubbing or edema    Pulses:  2+ and symmetric    Skin:  No jaundice, rashes, or lesions          Lab Results:   No visits with results within 1 Day(s) from this visit  Latest known visit with results is:   Admission on 10/12/2022, Discharged on 10/12/2022   Component Date Value   • SARS-CoV-2 10/12/2022 Negative          Radiology Results:   No results found    Answers for HPI/ROS submitted by the patient on 5/13/2023  Chronicity: recurrent  Onset: more than 1 month ago  Onset quality: gradual  Frequency: daily  Episode duration: 5 Days  Progression since onset: waxing and waning  Pain " location: LLQ, LUQ, RLQ, RUQ, generalized abdominal region, left flank  Pain - numeric: 7/10  Pain quality: cramping, sharp, tearing  Radiates to: LLQ, RLQ, RUQ, epigastric region, chest, pelvis, perineum  arthralgias: Yes  belching: Yes  constipation: Yes  diarrhea: Yes  dysuria: Yes  fever: No  flatus: Yes  frequency: Yes  headaches: Yes  hematochezia: No  hematuria: No  melena: No  myalgias: Yes  nausea: Yes  weight loss: Yes  vomiting: Yes  Aggravated by: being still, bowel movement, movement  Relieved by: activity, bowel movements, certain positions, eating, liquids, movement, palpation, passing flatus, recumbency, sitting up, standing, urination  Diagnostic workup: GI consult

## 2023-05-15 NOTE — PATIENT INSTRUCTIONS
Scheduled date of colonoscopy/EGD (as of today):06/20/2023  Physician performing colonoscopy:Dr Box  Location of colonoscopy:[facility]  Bowel prep reviewed with patient:Randi  Instructions reviewed with patient by:Humera  Clearances:  n/a

## 2023-06-12 ENCOUNTER — TELEPHONE (OUTPATIENT)
Dept: GASTROENTEROLOGY | Facility: CLINIC | Age: 28
End: 2023-06-12

## 2023-06-12 NOTE — TELEPHONE ENCOUNTER
Spoke with pt, he no longer has the instructions previously provided and he is having issues w/ charles  Sent procedure instructions to his email  Reviewed what not to eat 5 days out

## 2023-06-18 RX ORDER — SODIUM CHLORIDE 9 MG/ML
125 INJECTION, SOLUTION INTRAVENOUS CONTINUOUS
Status: CANCELLED | OUTPATIENT
Start: 2023-06-18

## 2023-06-20 ENCOUNTER — ANESTHESIA EVENT (OUTPATIENT)
Dept: GASTROENTEROLOGY | Facility: MEDICAL CENTER | Age: 28
End: 2023-06-20

## 2023-06-20 ENCOUNTER — HOSPITAL ENCOUNTER (OUTPATIENT)
Dept: GASTROENTEROLOGY | Facility: MEDICAL CENTER | Age: 28
Setting detail: OUTPATIENT SURGERY
Discharge: HOME | End: 2023-06-20
Payer: COMMERCIAL

## 2023-06-20 ENCOUNTER — ANESTHESIA (OUTPATIENT)
Dept: GASTROENTEROLOGY | Facility: MEDICAL CENTER | Age: 28
End: 2023-06-20

## 2023-06-20 VITALS
SYSTOLIC BLOOD PRESSURE: 115 MMHG | HEART RATE: 53 BPM | HEIGHT: 65 IN | WEIGHT: 137 LBS | BODY MASS INDEX: 22.82 KG/M2 | OXYGEN SATURATION: 99 % | RESPIRATION RATE: 15 BRPM | TEMPERATURE: 98.2 F | DIASTOLIC BLOOD PRESSURE: 67 MMHG

## 2023-06-20 DIAGNOSIS — R10.13 EPIGASTRIC ABDOMINAL PAIN: ICD-10-CM

## 2023-06-20 DIAGNOSIS — K59.00 CONSTIPATION, UNSPECIFIED CONSTIPATION TYPE: ICD-10-CM

## 2023-06-20 DIAGNOSIS — K62.89 RECTAL PAIN: ICD-10-CM

## 2023-06-20 PROCEDURE — 45380 COLONOSCOPY AND BIOPSY: CPT | Performed by: INTERNAL MEDICINE

## 2023-06-20 PROCEDURE — 43239 EGD BIOPSY SINGLE/MULTIPLE: CPT | Performed by: INTERNAL MEDICINE

## 2023-06-20 PROCEDURE — 88305 TISSUE EXAM BY PATHOLOGIST: CPT | Performed by: PATHOLOGY

## 2023-06-20 RX ORDER — PROPOFOL 10 MG/ML
INJECTION, EMULSION INTRAVENOUS CONTINUOUS PRN
Status: DISCONTINUED | OUTPATIENT
Start: 2023-06-20 | End: 2023-06-20

## 2023-06-20 RX ORDER — PROPOFOL 10 MG/ML
INJECTION, EMULSION INTRAVENOUS AS NEEDED
Status: DISCONTINUED | OUTPATIENT
Start: 2023-06-20 | End: 2023-06-20

## 2023-06-20 RX ORDER — SODIUM CHLORIDE 9 MG/ML
125 INJECTION, SOLUTION INTRAVENOUS CONTINUOUS
Status: DISCONTINUED | OUTPATIENT
Start: 2023-06-20 | End: 2023-06-24 | Stop reason: HOSPADM

## 2023-06-20 RX ADMIN — SODIUM CHLORIDE 125 ML/HR: 0.9 INJECTION, SOLUTION INTRAVENOUS at 10:48

## 2023-06-20 RX ADMIN — PROPOFOL 180 MG: 10 INJECTION, EMULSION INTRAVENOUS at 11:00

## 2023-06-20 RX ADMIN — PROPOFOL 60 MG: 10 INJECTION, EMULSION INTRAVENOUS at 11:02

## 2023-06-20 RX ADMIN — PROPOFOL 40 MG: 10 INJECTION, EMULSION INTRAVENOUS at 11:05

## 2023-06-20 RX ADMIN — PROPOFOL 60 MG: 10 INJECTION, EMULSION INTRAVENOUS at 11:04

## 2023-06-20 RX ADMIN — PROPOFOL 140 MCG/KG/MIN: 10 INJECTION, EMULSION INTRAVENOUS at 10:59

## 2023-06-20 NOTE — H&P
"History and Physical -  [facility]  Cassie Germain 34 y o  male MRN: [id number]                  HPI: Cassie Germain is a 34y o  year old male who presents for egd/colonoscpy       REVIEW OF SYSTEMS: Per the HPI, and otherwise unremarkable  Historical Information   Past Medical History:   Diagnosis Date   • Seizure [facility])     as a child     Past Surgical History:   Procedure Laterality Date   • EYE SURGERY       Social History   Social History     Substance and Sexual Activity   Alcohol Use Yes    Comment: rarely      Social History     Substance and Sexual Activity   Drug Use Not Currently   • Types: Marijuana    Comment: last smoked 6/17/18     Social History     Tobacco Use   Smoking Status Heavy Smoker   • Packs/day: 0 50   • Types: Cigarettes   Smokeless Tobacco Never     History reviewed  No pertinent family history      Meds/Allergies       Current Outpatient Medications:   •  acetaminophen (TYLENOL) 325 mg tablet  •  dextromethorphan-guaifenesin (MUCINEX DM)  MG per 12 hr tablet  •  dicyclomine (BENTYL) 20 mg tablet  •  emtricitabine-tenofovir (TRUVADA) 200-300 mg per tablet  •  famotidine (PEPCID) 20 mg tablet  •  famotidine (PEPCID) 20 mg tablet  •  fluticasone (FLONASE) 50 mcg/act nasal spray  •  ibuprofen (MOTRIN) 400 mg tablet  •  ibuprofen (MOTRIN) 600 mg tablet  •  naproxen (Naprosyn) 500 mg tablet  •  naproxen (Naprosyn) 500 mg tablet  •  ondansetron (Zofran ODT) 4 mg disintegrating tablet  •  ondansetron (ZOFRAN-ODT) 4 mg disintegrating tablet  •  polyethylene glycol (GOLYTELY) 4000 mL solution    Current Facility-Administered Medications:   •  sodium chloride 0 9 % infusion, 125 mL/hr, Intravenous, Continuous, 125 mL/hr at 06/20/23 1048    No Known Allergies    Objective     /81   Pulse 56   Temp 98 2 °F (36 8 °C) (Temporal)   Resp 16   Ht 5' 5\" (1 651 m)   Wt 62 1 kg (137 lb)   SpO2 100%   BMI 22 80 kg/m²       PHYSICAL EXAM    Gen: NAD  Head: NCAT  CV: " RRR  CHEST: Clear  ABD: soft, NT/ND  EXT: no edema      ASSESSMENT/PLAN:  This is a 34y o  year old male here for egd/colonoscopy, and he is stable and optimized for his procedure

## 2023-06-20 NOTE — ANESTHESIA PREPROCEDURE EVALUATION
Procedure:  EGD  COLONOSCOPY    Relevant Problems   ANESTHESIA (within normal limits)      CARDIO (within normal limits)      GI/HEPATIC (within normal limits)      NEURO/PSYCH (within normal limits)      PULMONARY (within normal limits)  ex smoker        Physical Exam    Airway    Mallampati score: II         Dental   No notable dental hx     Cardiovascular  Rhythm: regular, Rate: normal, Cardiovascular exam normal    Pulmonary  Pulmonary exam normal Breath sounds clear to auscultation,     Other Findings        Anesthesia Plan  ASA Score- 2     Anesthesia Type- IV sedation with anesthesia with ASA Monitors  Additional Monitors:   Airway Plan:           Plan Factors-    Chart reviewed  Existing labs reviewed  Patient summary reviewed  Patient is not a current smoker  Induction- intravenous  Postoperative Plan-     Informed Consent- Anesthetic plan and risks discussed with patient

## 2023-06-20 NOTE — ANESTHESIA POSTPROCEDURE EVALUATION
"Post-Op Assessment Note    CV Status:  Stable  Pain Score: 0    Pain management: adequate     Mental Status:  Alert and awake   Hydration Status:  Euvolemic   PONV Controlled:  Controlled   Airway Patency:  Patent      Post Op Vitals Reviewed: Yes      Staff: Anesthesiologist         No notable events documented      BP      Temp      Pulse    Resp      SpO2      /67   Pulse (!) 53   Temp 98 2 °F (36 8 °C) (Temporal)   Resp 15   Ht 5' 5\" (1 651 m)   Wt 62 1 kg (137 lb)   SpO2 99%   BMI 22 80 kg/m²     "

## 2023-06-26 PROCEDURE — 88305 TISSUE EXAM BY PATHOLOGIST: CPT | Performed by: PATHOLOGY

## 2023-06-27 ENCOUNTER — TELEPHONE (OUTPATIENT)
Dept: GASTROENTEROLOGY | Facility: CLINIC | Age: 28
End: 2023-06-27

## 2023-06-27 DIAGNOSIS — A04.8 HELICOBACTER PYLORI INFECTION: Primary | ICD-10-CM

## 2023-06-27 RX ORDER — CLARITHROMYCIN 500 MG/1
TABLET, COATED ORAL
Qty: 28 TABLET | Refills: 0 | Status: SHIPPED | OUTPATIENT
Start: 2023-06-27 | End: 2023-07-11

## 2023-06-27 RX ORDER — AMOXICILLIN 500 MG/1
CAPSULE ORAL
Qty: 56 CAPSULE | Refills: 0 | Status: SHIPPED | OUTPATIENT
Start: 2023-06-27 | End: 2023-07-11

## 2023-06-27 RX ORDER — OMEPRAZOLE 20 MG/1
CAPSULE, DELAYED RELEASE ORAL
Qty: 28 CAPSULE | Refills: 0 | Status: SHIPPED | OUTPATIENT
Start: 2023-06-27

## 2023-06-27 NOTE — TELEPHONE ENCOUNTER
----- Message from Arnulfo Buck LPN sent at [phone]  2:32 PM EDT -----  Order placed for stool H-Pylori  Please mail script to patient  Thank You!

## 2023-08-09 ENCOUNTER — TELEPHONE (OUTPATIENT)
Dept: GASTROENTEROLOGY | Facility: CLINIC | Age: 28
End: 2023-08-09

## 2023-08-11 ENCOUNTER — HOSPITAL ENCOUNTER (EMERGENCY)
Facility: HOSPITAL | Age: 28
Discharge: HOME | End: 2023-08-11
Attending: EMERGENCY MEDICINE
Payer: COMMERCIAL

## 2023-08-11 VITALS
DIASTOLIC BLOOD PRESSURE: 75 MMHG | SYSTOLIC BLOOD PRESSURE: 122 MMHG | TEMPERATURE: 98.5 F | HEART RATE: 75 BPM | RESPIRATION RATE: 17 BRPM | BODY MASS INDEX: 23.19 KG/M2 | WEIGHT: 139.33 LBS | OXYGEN SATURATION: 100 %

## 2023-08-11 DIAGNOSIS — Z20.2 POSSIBLE EXPOSURE TO STD: ICD-10-CM

## 2023-08-11 DIAGNOSIS — H66.90 OTITIS MEDIA: Primary | ICD-10-CM

## 2023-08-11 DIAGNOSIS — R05.9 COUGH: ICD-10-CM

## 2023-08-11 LAB
C TRACH DNA SPEC QL NAA+PROBE: NEGATIVE
FLUAV RNA RESP QL NAA+PROBE: NEGATIVE
FLUBV RNA RESP QL NAA+PROBE: NEGATIVE
N GONORRHOEA DNA SPEC QL NAA+PROBE: NEGATIVE
SARS-COV-2 RNA RESP QL NAA+PROBE: NEGATIVE

## 2023-08-11 PROCEDURE — 99283 EMERGENCY DEPT VISIT LOW MDM: CPT

## 2023-08-11 PROCEDURE — NC001 PR NO CHARGE

## 2023-08-11 PROCEDURE — 87591 N.GONORRHOEAE DNA AMP PROB: CPT

## 2023-08-11 PROCEDURE — 87636 SARSCOV2 & INF A&B AMP PRB: CPT

## 2023-08-11 PROCEDURE — 87491 CHLMYD TRACH DNA AMP PROBE: CPT

## 2023-08-11 PROCEDURE — 99284 EMERGENCY DEPT VISIT MOD MDM: CPT | Performed by: EMERGENCY MEDICINE

## 2023-08-11 RX ORDER — IBUPROFEN 600 MG/1
600 TABLET ORAL EVERY 6 HOURS PRN
Qty: 30 TABLET | Refills: 0 | Status: SHIPPED | OUTPATIENT
Start: 2023-08-11

## 2023-08-11 RX ORDER — AMOXICILLIN 500 MG/1
1000 CAPSULE ORAL 3 TIMES DAILY
Qty: 30 CAPSULE | Refills: 0 | Status: SHIPPED | OUTPATIENT
Start: 2023-08-11 | End: 2023-08-16

## 2023-08-11 RX ORDER — AMOXICILLIN 250 MG/1
1000 CAPSULE ORAL ONCE
Status: COMPLETED | OUTPATIENT
Start: 2023-08-11 | End: 2023-08-11

## 2023-08-11 RX ORDER — ACETAMINOPHEN 500 MG
1000 TABLET ORAL EVERY 8 HOURS
Qty: 40 TABLET | Refills: 0 | Status: SHIPPED | OUTPATIENT
Start: 2023-08-11

## 2023-08-11 RX ADMIN — AMOXICILLIN 1000 MG: 250 CAPSULE ORAL at 03:24

## 2023-08-11 NOTE — ED PROVIDER NOTES
History  Chief Complaint   Patient presents with   • Earache     Pt c/o left ear pain for ~3 days. • Exposure to STD     Pt states "I would like to be checked for STDs as well, and a covid test". 74-year-old male presents to ED for left earache. Patient states that his left ear has been painful for approximately 3 days. Denies discharge from ear. Denies loss of hearing in left ear. Patient also inferring about STD testing. Patient denies change in sexual partners. Patient is just concerned about presence of bump on genitals. Patient hesitant to further divulge information about bump specific location. Denies penile discharge, pain with urination, blood in urine, scrotal pain, testicular pain, penile pain. Lastly, patient would like COVID testing. He has had a cough for couple days. 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 Patient recently had colonoscopy performed. As part of patient education for colonoscopy he was informed that he may have a cough following colonoscopy. Patient just wants to be sure today. Prior to Admission Medications   Prescriptions Last Dose Informant Patient Reported?  Taking?   acetaminophen (TYLENOL) 325 mg tablet  Self No No   Sig: Take 2 tablets (650 mg total) by mouth every 6 (six) hours as needed for mild pain, headaches or fever   Patient not taking: Reported on 5/15/2023   dextromethorphan-guaifenesin (MUCINEX DM)  MG per 12 hr tablet  Self No No   Sig: Take 1 tablet by mouth every 12 (twelve) hours   Patient not taking: Reported on 5/15/2023   dicyclomine (BENTYL) 20 mg tablet  Self No No   Sig: Take 1 tablet (20 mg total) by mouth 2 (two) times a day   Patient not taking: Reported on 5/15/2023   emtricitabine-tenofovir (Any Spectdavid) 200-300 mg per tablet  Self Yes No   Sig: Take 1 tablet by mouth daily   Patient not taking: Reported on 5/15/2023   famotidine (PEPCID) 20 mg tablet  Self No No   Sig: Take 1 tablet (20 mg total) by mouth 2 (two) times a day   Patient not taking: Reported on 5/15/2023   famotidine (PEPCID) 20 mg tablet  Self No No   Sig: Take 1 tablet (20 mg total) by mouth 2 (two) times a day   Patient not taking: Reported on 5/15/2023   fluticasone (FLONASE) 50 mcg/act nasal spray  Self No No   Si spray into each nostril daily   Patient not taking: Reported on 5/15/2023   ibuprofen (MOTRIN) 400 mg tablet  Self No No   Sig: Take 1 tablet (400 mg total) by mouth every 6 (six) hours as needed for mild pain, fever or headaches   Patient not taking: Reported on 5/15/2023   ibuprofen (MOTRIN) 600 mg tablet  Self No No   Sig: Take 1 tablet (600 mg total) by mouth every 6 (six) hours as needed for mild pain   Patient not taking: Reported on 2020   naproxen (Naprosyn) 500 mg tablet  Self No No   Sig: Take 1 tablet (500 mg total) by mouth 2 (two) times a day with meals   Patient not taking: Reported on 5/15/2023   naproxen (Naprosyn) 500 mg tablet  Self No No   Sig: Take 1 tablet (500 mg total) by mouth 2 (two) times a day with meals   Patient not taking: Reported on 5/15/2023   omeprazole (PriLOSEC) 20 mg delayed release capsule   No No   Sig: Take 1 capsule by mouth every 12 hours for 14 days   ondansetron (ZOFRAN-ODT) 4 mg disintegrating tablet  Self No No   Sig: Take 1 tablet (4 mg total) by mouth every 8 (eight) hours as needed for nausea for up to 10 doses   Patient not taking: Reported on 5/15/2023   ondansetron (Zofran ODT) 4 mg disintegrating tablet  Self No No   Sig: Take 1 tablet (4 mg total) by mouth every 6 (six) hours as needed for nausea or vomiting   Patient not taking: Reported on 5/15/2023   polyethylene glycol (GOLYTELY) 4000 mL solution   No No   Sig: Take 4,000 mL by mouth once for 1 dose Follow GI office instructions      Facility-Administered Medications: None       Past Medical History:   Diagnosis Date   • Seizure [facility])     as a child       Past Surgical History:   Procedure Laterality Date   • EYE SURGERY         History reviewed. No pertinent family history. I have reviewed and agree with the history as documented. E-Cigarette/Vaping   • E-Cigarette Use Current Some Day User    • Comments Hooka      E-Cigarette/Vaping Substances     Social History     Tobacco Use   • Smoking status: Heavy Smoker     Packs/day: 0.50     Types: Cigarettes   • Smokeless tobacco: Never   Vaping Use   • Vaping Use: Some days   Substance Use Topics   • Alcohol use: Yes     Comment: rarely    • Drug use: Not Currently     Types: Marijuana     Comment: last smoked 6/17/18       Review of Systems   Constitutional: Negative for chills, diaphoresis, fatigue and fever. HENT: Positive for ear pain. Negative for congestion, dental problem, ear discharge, facial swelling, hearing loss, mouth sores, nosebleeds, sinus pressure, sneezing, sore throat and tinnitus. Eyes: Negative for photophobia, pain, discharge, redness, itching and visual disturbance. Respiratory: Positive for cough. Negative for shortness of breath, wheezing and stridor. Cardiovascular: Negative for chest pain and palpitations. Gastrointestinal: Negative for abdominal pain and vomiting. Genitourinary: Negative for difficulty urinating, dysuria, flank pain, frequency, hematuria, penile discharge, penile pain, penile swelling, scrotal swelling, testicular pain and urgency. Musculoskeletal: Negative for arthralgias and back pain. Skin: Negative for color change and rash. Neurological: Negative for seizures and syncope. All other systems reviewed and are negative. Physical Exam  Physical Exam  Vitals and nursing note reviewed. Constitutional:       General: He is not in acute distress. Appearance: Normal appearance. He is well-developed.  He is not ill-appearing, toxic-appearing or diaphoretic. HENT:      Head: Normocephalic and atraumatic. Right Ear: Hearing, tympanic membrane, ear canal and external ear normal.      Left Ear: Hearing, ear canal and external ear normal. No decreased hearing noted. No laceration or drainage. There is no impacted cerumen. No foreign body. No mastoid tenderness. No PE tube. No hemotympanum. Tympanic membrane is erythematous and bulging. Eyes:      Conjunctiva/sclera: Conjunctivae normal.   Cardiovascular:      Rate and Rhythm: Normal rate and regular rhythm. Heart sounds: No murmur heard. Pulmonary:      Effort: Pulmonary effort is normal. No accessory muscle usage, prolonged expiration, respiratory distress or retractions. Breath sounds: Normal breath sounds. No stridor. No decreased breath sounds, wheezing, rhonchi or rales. Abdominal:      Palpations: Abdomen is soft. Tenderness: There is no abdominal tenderness. Musculoskeletal:         General: No swelling. Cervical back: Neck supple. Skin:     General: Skin is warm and dry. Capillary Refill: Capillary refill takes less than 2 seconds. Neurological:      Mental Status: He is alert.    Psychiatric:         Mood and Affect: Mood normal.         Vital Signs  ED Triage Vitals   Temperature Pulse Respirations Blood Pressure SpO2   08/11/23 0248 08/11/23 0250 08/11/23 0250 08/11/23 0250 08/11/23 0250   98.5 °F (36.9 °C) 75 17 122/75 100 %      Temp Source Heart Rate Source Patient Position - Orthostatic VS BP Location FiO2 (%)   08/11/23 0248 08/11/23 0250 08/11/23 0250 08/11/23 0250 --   Oral Monitor Sitting Left arm       Pain Score       --                  Vitals:    08/11/23 0250   BP: 122/75   Pulse: 75   Patient Position - Orthostatic VS: Sitting         Visual Acuity      ED Medications  Medications   amoxicillin (AMOXIL) capsule 1,000 mg (1,000 mg Oral Given 8/11/23 0324)       Diagnostic Studies  Results Reviewed Procedure Component Value Units Date/Time    Chlamydia/GC amplified DNA by PCR [[id number]] Collected: 08/11/23 0342    Lab Status: In process Specimen: Urine, Other Updated: 08/11/23 0403    FLU/COVID - if FLU clinically relevant [[id number]] Collected: 08/11/23 0325    Lab Status: In process Specimen: Nares from Nose Updated: 08/11/23 0328                 No orders to display              Procedures  Procedures         ED Course                               SBIRT 20yo+    Flowsheet Row Most Recent Value   Initial Alcohol Screen: US AUDIT-C     1. How often do you have a drink containing alcohol? 0 Filed at: 08/11/2023 0306   2. How many drinks containing alcohol do you have on a typical day you are drinking? 0 Filed at: 08/11/2023 0306   3a. Male UNDER 65: How often do you have five or more drinks on one occasion? 0 Filed at: 08/11/2023 0306   Audit-C Score 0 Filed at: 08/11/2023 2837   BRIONNA: How many times in the past year have you. .. Used an illegal drug or used a prescription medication for non-medical reasons? Never Filed at: 08/11/2023 9597                    Medical Decision Making  46-year-old male presents to ED for left ear pain for 3 days. Patient also wanting to be evaluated for cough of several days, STD screening. Left ear pain is been present for 3 days. On physical examination he has a bulging, erythematous tympanic membrane. Suspect otitis media. Provided patient with dose of amoxicillin during visit. Provided patient with prescription for amoxicillin. Provided patient with COVID/flu swab to test for those etiologies of infection. Will inform patient on the results if positive. Normal breath sounds on auscultation. Regular rate and rhythm. Performed chlamydia and gonorrhea urine testing. Patient hesitant to divulge information about this concern for STD.   Provided patient with the contact information for various clinics in the area who could further evaluate possible STD patient is concerned about. Based on what patient did divulge, low suspicion for STD given that he has no penile discharge, change in sexual partners, genital ulcers. Bump may be dermatological in origin rather than STD. Will inform patient on results of chlamydia gonorrhea urine testing if positive. Advised patient to follow-up with primary care provider if ear pain does not resolve with amoxicillin in a couple days. At time of discharge patient not in apparent distress, no labored breathing, normotensive, afebrile, nontachycardic. Prior to discharge, discharge instructions were discussed with patient at bedside. Patient was provided both verbal and written instructions. Patient is understanding of the discharge instructions and is agreeable to plan of care. Return precautions were discussed with patient bedside, patient verbalized understanding of signs and symptoms that would necessitate return to the ED. All questions were answered. Patient was comfortable with the plan of care and discharged to home. Amount and/or Complexity of Data Reviewed  Labs: ordered. Risk  OTC drugs. Prescription drug management. Disposition  Final diagnoses:   Otitis media   Cough   Possible exposure to STD     Time reflects when diagnosis was documented in both MDM as applicable and the Disposition within this note     Time User Action Codes Description Comment    8/11/2023  3:21 AM Adwoa Fonseca Add [H66.90] Otitis media     8/11/2023  3:21 AM Adwoa Marian Add [R05.9] Cough     8/11/2023  3:31 AM Adwoa Fonseca Add [Z20.2] Possible exposure to STD       ED Disposition     ED Disposition   Discharge    Condition   Stable    Date/Time   Fri Aug 11, 2023  3:18 AM    Comment   Soham Brothers discharge to home/self care.                Follow-up Information     Follow up With Specialties Details Why Contact Info    Ceci Kapoor MD    [facility] [id number] [phone]      Ceci Kapoor MD Call   [place] [id number] [phone]            Discharge Medication List as of 8/11/2023  3:31 AM      START taking these medications    Details   !! acetaminophen (TYLENOL) 500 mg tablet Take 2 tablets (1,000 mg total) by mouth every 8 (eight) hours, Starting Fri 8/11/2023, Normal      amoxicillin (AMOXIL) 500 mg capsule Take 2 capsules (1,000 mg total) by mouth 3 (three) times a day for 5 days, Starting Fri 8/11/2023, Until Wed 8/16/2023, Normal      !! ibuprofen (MOTRIN) 600 mg tablet Take 1 tablet (600 mg total) by mouth every 6 (six) hours as needed for mild pain, Starting Fri 8/11/2023, Normal       !! - Potential duplicate medications found. Please discuss with provider.       CONTINUE these medications which have NOT CHANGED    Details   !! acetaminophen (TYLENOL) 325 mg tablet Take 2 tablets (650 mg total) by mouth every 6 (six) hours as needed for mild pain, headaches or fever, Starting Wed 10/12/2022, Normal      dextromethorphan-guaifenesin (MUCINEX DM)  MG per 12 hr tablet Take 1 tablet by mouth every 12 (twelve) hours, Starting Thu 9/29/2022, Normal      dicyclomine (BENTYL) 20 mg tablet Take 1 tablet (20 mg total) by mouth 2 (two) times a day, Starting Mon 5/9/2022, Normal      emtricitabine-tenofovir (TRUVADA) 200-300 mg per tablet Take 1 tablet by mouth daily, Starting Wed 11/29/2017, Historical Med      !! famotidine (PEPCID) 20 mg tablet Take 1 tablet (20 mg total) by mouth 2 (two) times a day, Starting Mon 5/9/2022, Normal      !! famotidine (PEPCID) 20 mg tablet Take 1 tablet (20 mg total) by mouth 2 (two) times a day, Starting Tue 5/2/2023, Print      fluticasone (FLONASE) 50 mcg/act nasal spray 1 spray into each nostril daily, Starting Thu 9/29/2022, Normal      !! ibuprofen (MOTRIN) 400 mg tablet Take 1 tablet (400 mg total) by mouth every 6 (six) hours as needed for mild pain, fever or headaches, Starting Thu 9/29/2022, Normal      !! ibuprofen (MOTRIN) 600 mg tablet Take 1 tablet (600 mg total) by mouth every 6 (six) hours as needed for mild pain, Starting Mon 9/2/2019, Print      !! naproxen (Naprosyn) 500 mg tablet Take 1 tablet (500 mg total) by mouth 2 (two) times a day with meals, Starting Thu 2/3/2022, Normal      !! naproxen (Naprosyn) 500 mg tablet Take 1 tablet (500 mg total) by mouth 2 (two) times a day with meals, Starting Wed 10/12/2022, Normal      omeprazole (PriLOSEC) 20 mg delayed release capsule Take 1 capsule by mouth every 12 hours for 14 days, Normal      !! ondansetron (Zofran ODT) 4 mg disintegrating tablet Take 1 tablet (4 mg total) by mouth every 6 (six) hours as needed for nausea or vomiting, Starting Mon 5/9/2022, Normal      !! ondansetron (ZOFRAN-ODT) 4 mg disintegrating tablet Take 1 tablet (4 mg total) by mouth every 8 (eight) hours as needed for nausea for up to 10 doses, Starting Wed 12/29/2021, Print      polyethylene glycol (GOLYTELY) 4000 mL solution Take 4,000 mL by mouth once for 1 dose Follow GI office instructions, Starting Mon 5/15/2023, Normal       !! - Potential duplicate medications found. Please discuss with provider. No discharge procedures on file.     PDMP Review     None          ED Provider  Electronically Signed by           Riley Chappell PA-C  08/11/23 6949

## 2023-08-11 NOTE — DISCHARGE INSTRUCTIONS
Today I provided you with prescription for amoxicillin. Take as directed. Amoxicillin will help treat your ear infection seen on exam today. We will call you with the results of the urine gonorrhea and chlamydia test if positive. Will also inform you of the results of the COVID test if positive. I provided you with the contact information for the local sexual health clinic for further evaluation. Follow-up with primary care provider for routine health maintenance visits. Return to ED for new or worsening symptoms. Telephone  [phone], [facility]      Office Hours:  Monday - Friday, 8:00 AM - 4:30 PM    Sexually Transmitted Disease (STD) Clinic:  Walk-in clinic / No appointments scheduled  Date:  Tuesday, 1:00 PM - 5:00 PM* (includes full STD exam and HIV blood test)  Cost:  Free for both [address] [place] [address] and non-City residents  *Note: The number of patients accepted into clinic for STD testing is subject to provider availability.        HIV Testing Clinic:  Walk-in clinic / No appointments scheduled  Dates:   Tuesday, 1:00 PM - 5:00 PM (HIV/Syphilis)                 Thursday 2:00 PM - 4:00 PM (HIV/Syphilis)  Cost:  Free for both [address] [place] [address] and [facility]

## 2023-08-23 PROBLEM — Z20.2 EXPOSURE TO STD: Status: ACTIVE | Noted: 2017-11-29

## 2023-08-24 ENCOUNTER — OFFICE VISIT (OUTPATIENT)
Dept: AUDIOLOGY | Facility: CLINIC | Age: 28
End: 2023-08-24
Payer: COMMERCIAL

## 2023-08-24 ENCOUNTER — OFFICE VISIT (OUTPATIENT)
Dept: OTOLARYNGOLOGY | Facility: CLINIC | Age: 28
End: 2023-08-24
Payer: COMMERCIAL

## 2023-08-24 DIAGNOSIS — H93.8X3 CLOGGED EAR, BILATERAL: ICD-10-CM

## 2023-08-24 DIAGNOSIS — H69.93 TYPE C TYMPANOGRAM OF BOTH EARS: ICD-10-CM

## 2023-08-24 DIAGNOSIS — J34.2 DEVIATED NASAL SEPTUM: ICD-10-CM

## 2023-08-24 DIAGNOSIS — H61.23 BILATERAL IMPACTED CERUMEN: ICD-10-CM

## 2023-08-24 DIAGNOSIS — J35.2 ADENOID HYPERTROPHY: ICD-10-CM

## 2023-08-24 DIAGNOSIS — H90.11 CONDUCTIVE HEARING LOSS OF RIGHT EAR WITH UNRESTRICTED HEARING OF LEFT EAR: Primary | ICD-10-CM

## 2023-08-24 DIAGNOSIS — M26.609 TMJ (TEMPOROMANDIBULAR JOINT SYNDROME): ICD-10-CM

## 2023-08-24 DIAGNOSIS — R49.0 HOARSENESS: ICD-10-CM

## 2023-08-24 DIAGNOSIS — H69.83 DYSFUNCTION OF BOTH EUSTACHIAN TUBES: Primary | ICD-10-CM

## 2023-08-24 DIAGNOSIS — J34.3 NASAL TURBINATE HYPERTROPHY: ICD-10-CM

## 2023-08-24 DIAGNOSIS — K21.9 LARYNGOPHARYNGEAL REFLUX (LPR): ICD-10-CM

## 2023-08-24 PROCEDURE — 31575 DIAGNOSTIC LARYNGOSCOPY: CPT | Performed by: PHYSICIAN ASSISTANT

## 2023-08-24 PROCEDURE — 92557 COMPREHENSIVE HEARING TEST: CPT | Performed by: AUDIOLOGIST

## 2023-08-24 PROCEDURE — 69210 REMOVE IMPACTED EAR WAX UNI: CPT | Performed by: PHYSICIAN ASSISTANT

## 2023-08-24 PROCEDURE — 99204 OFFICE O/P NEW MOD 45 MIN: CPT | Performed by: PHYSICIAN ASSISTANT

## 2023-08-24 PROCEDURE — 92567 TYMPANOMETRY: CPT | Performed by: AUDIOLOGIST

## 2023-08-24 RX ORDER — OMEPRAZOLE 40 MG/1
40 CAPSULE, DELAYED RELEASE ORAL DAILY
Qty: 90 CAPSULE | Refills: 0 | Status: SHIPPED | OUTPATIENT
Start: 2023-08-24 | End: 2024-02-20

## 2023-08-24 RX ORDER — FLUTICASONE PROPIONATE 50 MCG
1 SPRAY, SUSPENSION (ML) NASAL 2 TIMES DAILY
Qty: 16 G | Refills: 3 | Status: SHIPPED | OUTPATIENT
Start: 2023-08-24

## 2023-08-24 RX ORDER — METHYLPREDNISOLONE 4 MG/1
TABLET ORAL
Qty: 21 TABLET | Refills: 0 | Status: SHIPPED | OUTPATIENT
Start: 2023-08-24

## 2023-08-24 NOTE — PROGRESS NOTES
Otolaryngology Clinic Visit  Name:  Patrice Sewell  MRN:  [id number]  Date:  8/24/2023 4:08 PM  ________________________________________________________________________       CHIEF COMPLAINT:   Bilateral ear pain, hoarseness     HPI:  Patrice Sewell is a 34 y.o. male who presents with bilateral ear pain, muffled hearing, hoarseness for about 3 to 4 weeks. Positive popping in the ears he was seen and has had multiple antibiotics in the last 6-8 weeks. No improvement with this. Denies history of allergies, previous ear surgeries, drainage, vertigo. PMHx:  Past Medical History:   Diagnosis Date   • Seizure [facility])     as a child       PSHx:  Past Surgical History:   Procedure Laterality Date   • EYE SURGERY         FAMHx:  No family history on file. SOCHx:  Social History     Socioeconomic History   • Marital status: Single     Spouse name: Not on file   • Number of children: Not on file   • Years of education: Not on file   • Highest education level: Not on file   Occupational History   • Not on file   Tobacco Use   • Smoking status: Heavy Smoker     Packs/day: 0.50     Types: Cigarettes   • Smokeless tobacco: Never   Vaping Use   • Vaping Use: Some days   Substance and Sexual Activity   • Alcohol use: Yes     Comment: rarely    • Drug use: Not Currently     Types: Marijuana     Comment: last smoked 6/17/18   • Sexual activity: Not on file   Other Topics Concern   • Not on file   Social History Narrative   • Not on file     Social Determinants of Health     Financial Resource Strain: Not on file   Food Insecurity: Not on file   Transportation Needs: Not on file   Physical Activity: Not on file   Stress: Not on file   Social Connections: Not on file   Intimate Partner Violence: Not on file   Housing Stability: Not on file       Allergies:  No Known Allergies     MEDS:  Reviewed    ROS:  As above       PHYSICAL EXAM:  There were no vitals taken for this visit.   General: NAD, AOx4  Eyes:  EOMI  Ears:  Right: ear canal with cerumen, TM intact but retracted, no fluid  Left: ear canal normal, TM intact but retracted, no fluid  Nose: Right septal deviation, moderate to severe inferior turbinate hypertrophy, no mass/lesions  Oral cavity:  No trismus, no mass/lesions, tonsils 1+  Neck: Trachea is midline; no thyroid nodules, Salivary glands symmetrical, no masses/abnormality on palpation  Lymph:  No cervical lymphadenopathy  Skin:  No obvious facial lesions  Neuro: Face symmetrical, no obvious cranial nerve palsy. No focal deficits   Lungs:  Normal work of breathing, symmetrical chest expansion  Vascular: Well perfused  TMJ: Tender bilaterally    Procedures:  FIBEROPTIC LARYNGOSCOPY:  Procedure:Fiberoptic nasopharyngolaryngoscopy  Diagnosis: Nasal congestion, bilateral ear pain, hoarseness  : Miko HIRSCH    The risks, benefits and alternatives were discussed. The patient voiced their understanding. They wished to proceed and an informed consent was obtained. The patient's nose was topically decongested and anesthetized using Lidocaine and oxymetazoline. The fiberoptic endoscope was inserted via both nasal cavities. Findings listed below:  -- nasal cavity; significant congestion bilaterally, worse on the right, significant clear mucus throughout both nasal cavities  -nasopharynx: Hypertrophic adenoid tissue with significant clear mucus throughout, eustachian tubes blocked by adenoid tissue  Normal fossa of Rosenmüller, oropharynx, BOT, epiglottis. - Vocal folds mobile bilaterally  - No Subglottic edema  - No Ventricular obliteration  -Moderate arytenoid erythema  -Moderate diffuse edema without vocal fold edema  - No posterior commissure hypertrophy  - No granulation  - No thick endolaryngeal mucus    Patient informed of the finding of bilateral cerumen impaction obstructing visualization of the drum. Recommended removal.  Patient was in agreement gave verbal consent.   Cerumen was removed from both canals using suction  Medical Data Reviewed:  Records reviewed and summarized as in Epic  Audiogram from 8/24/2023 : Normal  Tympanogram from Today type C bilaterally  Excellent speech discrimination  Reviewed personally with patient and audiology    Radiology:      Labs:      Patient Active Problem List   Diagnosis   • Exposure to STD   • Acne       ASSESSMENT/PLAN:  Marsha Pemberton is a 34 y.o. male with acute and chronic problems as above who presents with:    1. Dysfunction of both eustachian tubes    2. Clogged ear, bilateral    3. Bilateral impacted cerumen    4. Deviated nasal septum    5. Nasal turbinate hypertrophy    6. Adenoid hypertrophy    7. Laryngopharyngeal reflux (LPR)    8. Hoarseness    9. TMJ (temporomandibular joint syndrome)        34 y.o. here today for further evaluation of bilateral ear popping, decreased hearing, hoarseness which has been ongoing for 4 weeks now. No improvement with antibiotics. On exam, there is cerumen in both EACs, cleared without any complication. TM intact bilaterally but both are retracted, no fluid. Nasal endoscopy was performed which revealed significant nasal congestion, right greater than left. Hypertrophic adenoid tissue blocking eustachian tubes bilaterally. Also moderate evidence of arytenoid edema consistent with LPR. Bilateral TMJ tenderness. For his hypertrophic adenoids/nasal congestion/eustachian tube dysfunction I recommend using Flonase 1 spray to each nostril twice daily along with a Medrol Dosepak to decrease inflammation. Start Zyrtec at bedtime. We discussed the risks benefits and alternatives to oral prednisone therapy, including, but not limited to, GI distress in worsening of ulcers, elevation of blood sugar and diabetic complications, psychiatric complications, and avascular necrosis of hip.      -TMJ mitigation with: Warm compress TID, Ice TID, soft diet, no chewing gum, dental mouth guard and Motrin 600mg TID x14 days (if medically able)  -Advised to follow up with dentist for further TMJ evaluation.  -Can consider PT as well. We discussed that their symptoms are most consistent with reflux. I explained multiple strategies for lifestyle management including elevating the head of bed , limiting carbonated beverages, not eating dinner or any snacks within 2-3 hours of lying down for the night, limiting caffeine especially at nighttime, and limiting trigger foods. I also have prescribed Omeprazole 40 mg 1 take 30-60 min before breakfast.      Follow-up in 6 weeks. If symptoms persist we can consider allergy testing and/or advanced imaging.         [facility]  Otolaryngology  [facility]   8/24/2023 4:08 PM

## 2023-08-24 NOTE — PROGRESS NOTES
AUDIOLOGY AUDIOMETRIC EVALUATION      Name:  Francisco Pappas  :  1995  Age:  34 y.o. Date of Evaluation: 2023    History: Blocked Ears  Reason for visit:  Mr. Moraima Jarrett is seen today at the request of Bonita Vigil PA-C for an evaluation of hearing. EVALUATION RESULTS:         Audiogram Description:     Mild CHL AD / Normal hearing thresholds AS        Impedence Audiometry:     Tympanometry     Type Vol Press Comp   R C 1.8 ml -290 daPa 1.3 ml   L C 1.7 ml -155 daPa 1.6 ml         Speech Audiometry:     Right Ear:  SRT: 81MD                     WRS was excellent (100%) at 55dB presentation level     Left Ear:  SRT: 15dB         WRS was excellent (100%) at 50dB presentation level        Test-Retest Reliability: Good  PT Stimulus: Puretone  Speech Stimulus: Recorded  Recognition Test: NU-6 (2,3)  Response: Push Button  Transducer: Inserts        FOLLOW-UP  Patient to follow-up with provider afterwards for review.        Saji Pereyra.   Licensed Audiologist

## 2024-06-13 ENCOUNTER — NURSE TRIAGE (OUTPATIENT)
Age: 29
End: 2024-06-13

## 2024-06-13 NOTE — TELEPHONE ENCOUNTER
----- Message from Nadya FENG sent at 6/13/2024  1:56 PM EDT -----  Dr. Box patient not seen since last June, procedures Colon/EGD showed   Hiatal hernia and H-Pylori, Patient never got treatment for H-Pylori and is now having sever abdominal pain.  No immediate appointment availability.  Please return his call to discuss

## 2024-06-13 NOTE — TELEPHONE ENCOUNTER
LOV 5/15/23 Dr. Box epigastric abd pain, Procedure combo 6/20/23. I contacted [facility] pharmacy and they confirmed Biaxin, amoxicillin and omeprazole ordered 6/27/23 were picked up on 6/29/23 for H Pylori treatment.      Returned call, no answer, left message requesting a call back.

## 2024-06-14 NOTE — TELEPHONE ENCOUNTER
Agree with recommendation for ED for severe abdominal pain.  Recommend scheduling follow-up office visit.

## 2024-06-14 NOTE — TELEPHONE ENCOUNTER
I called and lvm for the patient to call the office back and schedule f/u appointment post ED visit.

## 2024-06-14 NOTE — TELEPHONE ENCOUNTER
"I spoke with patient, he cannot recall taking the H Pylori treatment ordered last June, though I did review that [facility] pharmacy has record that he picked up medications. Patient is noting severe abdominal pain, n/v. I reviewed that he should report to ED for evaluation. Patient states he will report to [facility].      Reason for Disposition   SEVERE abdominal pain (e.g., excruciating)    Answer Assessment - Initial Assessment Questions  1. LOCATION: \"Where does it hurt?\"       Mid upper sternal area  2. RADIATION: \"Does the pain shoot anywhere else?\" (e.g., chest, back)      Sometimes mid back and with position changes  3. ONSET: \"When did the pain begin?\" (Minutes, hours or days ago)       About one month  4. SUDDEN: \"Gradual or sudden onset?\"      sudden  5. PATTERN \"Does the pain come and go, or is it constant?\"     - If constant: \"Is it getting better, staying the same, or worsening?\"       (Note: Constant means the pain never goes away completely; most serious pain is constant and it progresses)      - If intermittent: \"How long does it last?\" \"Do you have pain now?\"      (Note: Intermittent means the pain goes away completely between bouts)      constant  6. SEVERITY: \"How bad is the pain?\"  (e.g., Scale 1-10; mild, moderate, or severe)     - MILD (1-3): doesn't interfere with normal activities, abdomen soft and not tender to touch      - MODERATE (4-7): interferes with normal activities or awakens from sleep, tender to touch      - SEVERE (8-10): excruciating pain, doubled over, unable to do any normal activities        Severe pain  7. RECURRENT SYMPTOM: \"Have you ever had this type of stomach pain before?\" If Yes, ask: \"When was the last time?\" and \"What happened that time?\"       Had pain prior last year  8. CAUSE: \"What do you think is causing the stomach pain?\"      ? H Pylori  9. RELIEVING/AGGRAVATING FACTORS: \"What makes it better or worse?\" (e.g., movement, antacids, bowel movement)      Movement " "makes it worse  10. OTHER SYMPTOMS: \"Has there been any vomiting, diarrhea, constipation, or urine problems?\"        Nausea. vomiting    Protocols used: Abdominal Pain - Male-ADULT-[place]    "

## 2025-06-30 DIAGNOSIS — Z00.6 ENCOUNTER FOR EXAMINATION FOR NORMAL COMPARISON OR CONTROL IN CLINICAL RESEARCH PROGRAM: ICD-10-CM
